# Patient Record
Sex: MALE | Race: WHITE | NOT HISPANIC OR LATINO | Employment: FULL TIME | URBAN - NONMETROPOLITAN AREA
[De-identification: names, ages, dates, MRNs, and addresses within clinical notes are randomized per-mention and may not be internally consistent; named-entity substitution may affect disease eponyms.]

---

## 2023-01-17 ENCOUNTER — OFFICE VISIT (OUTPATIENT)
Dept: UROLOGY | Facility: CLINIC | Age: 48
End: 2023-01-17
Payer: COMMERCIAL

## 2023-01-17 VITALS — WEIGHT: 270 LBS | BODY MASS INDEX: 36.57 KG/M2 | HEIGHT: 72 IN

## 2023-01-17 DIAGNOSIS — N42.9 DISORDER OF PROSTATE: ICD-10-CM

## 2023-01-17 DIAGNOSIS — R35.0 FREQUENCY OF URINATION: Primary | ICD-10-CM

## 2023-01-17 DIAGNOSIS — R79.89 LOW TESTOSTERONE: ICD-10-CM

## 2023-01-17 LAB
BILIRUB BLD-MCNC: NEGATIVE MG/DL
CLARITY, POC: CLEAR
COLOR UR: YELLOW
DEPRECATED RDW RBC AUTO: 40 FL (ref 37–54)
ERYTHROCYTE [DISTWIDTH] IN BLOOD BY AUTOMATED COUNT: 13 % (ref 12.3–15.4)
ESTRADIOL SERPL HS-MCNC: 30.5 PG/ML
EXPIRATION DATE: ABNORMAL
GLUCOSE UR STRIP-MCNC: ABNORMAL MG/DL
HCT VFR BLD AUTO: 49.3 % (ref 37.5–51)
HGB BLD-MCNC: 17 G/DL (ref 13–17.7)
KETONES UR QL: NEGATIVE
LEUKOCYTE EST, POC: NEGATIVE
Lab: ABNORMAL
MCH RBC QN AUTO: 29 PG (ref 26.6–33)
MCHC RBC AUTO-ENTMCNC: 34.5 G/DL (ref 31.5–35.7)
MCV RBC AUTO: 84.1 FL (ref 79–97)
NITRITE UR-MCNC: NEGATIVE MG/ML
PH UR: 5.5 [PH] (ref 5–8)
PLATELET # BLD AUTO: 250 10*3/MM3 (ref 140–450)
PMV BLD AUTO: 11.5 FL (ref 6–12)
PROT UR STRIP-MCNC: NEGATIVE MG/DL
PSA SERPL-MCNC: 0.52 NG/ML (ref 0–4)
RBC # BLD AUTO: 5.86 10*6/MM3 (ref 4.14–5.8)
RBC # UR STRIP: NEGATIVE /UL
SP GR UR: 1.01 (ref 1–1.03)
TESTOST SERPL-MCNC: 265 NG/DL (ref 249–836)
UROBILINOGEN UR QL: NORMAL
WBC NRBC COR # BLD: 9.45 10*3/MM3 (ref 3.4–10.8)

## 2023-01-17 PROCEDURE — 82670 ASSAY OF TOTAL ESTRADIOL: CPT | Performed by: UROLOGY

## 2023-01-17 PROCEDURE — 81003 URINALYSIS AUTO W/O SCOPE: CPT | Performed by: UROLOGY

## 2023-01-17 PROCEDURE — 85027 COMPLETE CBC AUTOMATED: CPT | Performed by: UROLOGY

## 2023-01-17 PROCEDURE — 84153 ASSAY OF PSA TOTAL: CPT | Performed by: UROLOGY

## 2023-01-17 PROCEDURE — 84403 ASSAY OF TOTAL TESTOSTERONE: CPT | Performed by: UROLOGY

## 2023-01-17 PROCEDURE — 99203 OFFICE O/P NEW LOW 30 MIN: CPT | Performed by: UROLOGY

## 2023-01-17 RX ORDER — BACLOFEN 10 MG/1
10 TABLET ORAL 2 TIMES DAILY PRN
COMMUNITY
Start: 2023-01-06

## 2023-01-17 RX ORDER — LOSARTAN POTASSIUM 100 MG/1
100 TABLET ORAL DAILY
COMMUNITY
Start: 2023-01-06

## 2023-01-17 RX ORDER — IBUPROFEN 800 MG/1
800 TABLET ORAL
COMMUNITY
Start: 2023-01-06

## 2023-01-17 RX ORDER — TADALAFIL 5 MG/1
5 TABLET ORAL DAILY PRN
Qty: 30 TABLET | Refills: 6 | Status: SHIPPED | OUTPATIENT
Start: 2023-01-17

## 2023-01-17 RX ORDER — INSULIN GLARGINE 100 [IU]/ML
INJECTION, SOLUTION SUBCUTANEOUS
COMMUNITY
Start: 2022-11-29

## 2023-01-17 RX ORDER — HYDROCHLOROTHIAZIDE 12.5 MG/1
12.5 CAPSULE, GELATIN COATED ORAL DAILY
COMMUNITY
Start: 2022-12-13

## 2023-01-17 RX ORDER — TESTOSTERONE CYPIONATE 200 MG/ML
INJECTION, SOLUTION INTRAMUSCULAR
Qty: 10 ML | Refills: 2 | Status: SHIPPED | OUTPATIENT
Start: 2023-01-17 | End: 2023-03-16 | Stop reason: SDUPTHER

## 2023-01-17 RX ORDER — CITALOPRAM 40 MG/1
40 TABLET ORAL DAILY
COMMUNITY
Start: 2023-01-06

## 2023-01-17 RX ORDER — GLIPIZIDE 10 MG/1
TABLET ORAL EVERY 8 HOURS
COMMUNITY
Start: 2022-09-30

## 2023-01-17 RX ORDER — DAPAGLIFLOZIN 5 MG/1
5 TABLET, FILM COATED ORAL EVERY MORNING
COMMUNITY
Start: 2023-01-06

## 2023-01-17 RX ORDER — SITAGLIPTIN 100 MG/1
100 TABLET, FILM COATED ORAL DAILY
COMMUNITY
Start: 2022-12-13

## 2023-01-17 RX ORDER — BUSPIRONE HYDROCHLORIDE 5 MG/1
TABLET ORAL
COMMUNITY
Start: 2023-01-06

## 2023-01-17 NOTE — PROGRESS NOTES
"Chief Complaint:      Chief Complaint   Patient presents with   • Fatigue     New patient        HPI:   47 y.o. male referred for fatigue.  He has a positive JULIO-androgen deficiency in the age male questionnaire:   The patient was queried regarding the androgen deficiency in the age male questionnaire.  This is a validated questionnaire that was performed on a set of 314 [place] male physicians. When it was positive, it correlated directly with a 94% chance of low testosterone.  Patient indicates there is a decrease in libido or sex drive, a lack of energy, decreased  strength and endurance, a decreased \"enjoyment of life\", sad and grumpy feelings with significant difficulty maintaining erections. There has also been a recent deterioration regarding work performance.  His hemoglobin A1c is 8 he has a negative family history for prostate cancer he has had no surgeries he carries the diagnosis of depression he brought significant labs including hemoglobin of 15.7.  A creatinine of 0.68.  And a testosterone of 326.  With an A1c of 8.0.  He is desirous of instituting testosterone therapy he also has mild to moderate erectile dysfunction.    Past Medical History:     History reviewed. No pertinent past medical history.    Current Meds:     Current Outpatient Medications   Medication Sig Dispense Refill   • baclofen (LIORESAL) 10 MG tablet Take 10 mg by mouth 2 (Two) Times a Day As Needed.     • busPIRone (BUSPAR) 5 MG tablet TAKE ONE TABLET BY MOUTH THREE TIMES A DAY FOR ANXIETY     • citalopram (CeleXA) 40 MG tablet Take 40 mg by mouth Daily.     • Farxiga 5 MG tablet tablet Take 5 mg by mouth Every Morning.     • glipizide (GLUCOTROL) 10 MG tablet Take  by mouth Every 8 (Eight) Hours.     • hydroCHLOROthiazide (MICROZIDE) 12.5 MG capsule Take 12.5 mg by mouth Daily.     • ibuprofen (ADVIL,MOTRIN) 800 MG tablet Take 800 mg by mouth 3 (Three) Times a Day With Meals.     • Insulin Glargine (BASAGLAR KWIKPEN) 100 UNIT/ML " injection pen INJECT 15 UNITS SUBCUTANEOUSLY EVERY DAY AT BEDTIME. MAY TITRATE BY 2 TO 4 UNITS EVERY 2 TO 4 DAYS TO GET FASTING BLOOD SUGAR LESS THAN 130     • Januvia 100 MG tablet Take 100 mg by mouth Daily.     • losartan (COZAAR) 100 MG tablet Take 100 mg by mouth Daily.       No current facility-administered medications for this visit.        Allergies:      No Known Allergies     Past Surgical History:     History reviewed. No pertinent surgical history.    Social History:     Social History     Socioeconomic History   • Marital status:        Family History:     History reviewed. No pertinent family history.    Review of Systems:     Review of Systems   Constitutional: Negative.    HENT: Negative.    Eyes: Negative.    Respiratory: Negative.    Cardiovascular: Negative.    Gastrointestinal: Negative.    Endocrine: Negative.    Musculoskeletal: Negative.    Allergic/Immunologic: Negative.    Neurological: Negative.    Hematological: Negative.    Psychiatric/Behavioral: Negative.        Physical Exam:     Physical Exam  Vitals and nursing note reviewed.   Constitutional:       Appearance: He is well-developed.   HENT:      Head: Normocephalic and atraumatic.   Eyes:      Conjunctiva/sclera: Conjunctivae normal.      Pupils: Pupils are equal, round, and reactive to light.   Cardiovascular:      Rate and Rhythm: Normal rate and regular rhythm.      Heart sounds: Normal heart sounds.   Pulmonary:      Effort: Pulmonary effort is normal.      Breath sounds: Normal breath sounds.   Abdominal:      General: Bowel sounds are normal.      Palpations: Abdomen is soft.   Genitourinary:     Penis: Normal.       Testes: Normal.   Musculoskeletal:         General: Normal range of motion.      Cervical back: Normal range of motion.   Skin:     General: Skin is warm and dry.   Neurological:      Mental Status: He is alert and oriented to person, place, and time.      Deep Tendon Reflexes: Reflexes are normal and  symmetric.   Psychiatric:         Behavior: Behavior normal.         Thought Content: Thought content normal.         Judgment: Judgment normal.         I have reviewed the following portions of the patient's history: Allergies, current medications, past family history, past medical history, past social history, past surgical history, problem list, and ROS and confirm it is accurate.    Recent Image (CT and/or KUB):      CT Abdomen and Pelvis: No results found for this or any previous visit.       CT Stone Protocol: No results found for this or any previous visit.       KUB: No results found for this or any previous visit.       Labs (past 3 months):      No visits with results within 3 Month(s) from this visit.   Latest known visit with results is:   No results found for any previous visit.        Procedure:       Assessment/Plan:   Low Testosterone: This pleasant male patient presents today with signs and symptoms that are consistent with low testosterone. He has positive Chandra questionnaire by history, this includes both the sexual and nonsexual side effects.  Sexual side effects include inability to achieve and maintain an erection, inability to maintain his erection, and decreased interest in sexual activity.  Nonsexual symptomatology includes fatigue, difficulty completing a job, and tiredness.  We had a discussion of the various forms of testosterone available including parenteral, topical, and the form of a patch.  We discussed the efficacy of the gels and the injections, as well as the cost and benefits analysis.  We discussed the studies and talked about heart disease and its effect on prostate cancer, both of which are negligible.  He gave verbal consent to proceed with treatment.  He understands the risks and benefits of length.  He also completed his attempts at fertility. He understands the partial effect on spermatogenesis.  Erectile dysfunction-we discussed the anatomy and physiology of the penis and  the endothelium.  We discussed the various forms of erectile dysfunction including peripheral vascular occlusive disease, postoperative, secondary to radiation treatments of the prostate, and arterial inflow.  We discussed the various treatment options available including oral medication and its various forms.  We discussed the use of both generic and non-generic Viagra.  We discussed Cialis and a longer half-life of 17 hours as well as the other 2 medications.  We discussed cost involved with this including the fact that the generic is much cheaper but is taken as multiple pills because they are 20 mg dosages.  We did discuss the other alternatives including penile injections, vacuum erection devices and surgical intervention reserved for only the most severe cases.  We discussed the need for testosterone in about 20% of cases of erectile dysfunction.  Start tadalafil  PSA testing-I am recommending a PSA blood test that stands for prostate specific antigen.  I discussed the pathophysiology of PSA testing indicating its use in the diagnosis and management of prostate cancer.  I discussed the normal range being 0 to 4, but more appropriately being much closer to 0 to 2 in a normal male.  I discussed the fact that after a certain age we don't recommend PSA testing especially in view of numerous comorbidities, that this will not be a useful test.  I discussed many of the things that can artificially raise PSA including a recent infection, urinary tract infection, and recent sexual intercourse, or even the type of movement such as manipulation of the prostate from riding a bicycle.  After all this is taken into account when the test is reviewed, the most important use of PSA is the velocity measurement.  In other words, the change of PSA with time is a very important factor in the use and that we look for greater than 20% rise over a year to help us make the prediction of prostate cancer.  I also discussed that the use  with prostate cancer indicating that after a radical prostatectomy, the PSA should be 0 and any rise indicates an early biochemical recurrence.  Hyperestrogenism-we spoke about the role of estrogen metabolism and breakdown in the  presence of testosterone replacement therapy.  We spoke about how high estradiol levels can interfere with the improvement noted in a man on testosterone as well as significant side effects such as pseudogynecomastia.  We discussed the use of the medication Arimidex using a very judicious low-dose fashion to prevent too low of an estradiol which would precipitate bone complications.          This document has been electronically signed by JERROD GOLDSTEIN MD January 17, 2023 09:09 EST    Dictated Utilizing Dragon Dictation: Part of this note may be an electronic transcription/translation of spoken language to printed text using the Dragon Dictation System.

## 2023-01-18 PROBLEM — R79.89 LOW TESTOSTERONE: Status: ACTIVE | Noted: 2023-01-18

## 2023-01-25 ENCOUNTER — TELEPHONE (OUTPATIENT)
Dept: UROLOGY | Facility: CLINIC | Age: 48
End: 2023-01-25
Payer: COMMERCIAL

## 2023-03-16 ENCOUNTER — OFFICE VISIT (OUTPATIENT)
Dept: UROLOGY | Facility: CLINIC | Age: 48
End: 2023-03-16
Payer: COMMERCIAL

## 2023-03-16 VITALS — WEIGHT: 270 LBS | HEIGHT: 72 IN | BODY MASS INDEX: 36.57 KG/M2

## 2023-03-16 DIAGNOSIS — N42.9 DISORDER OF PROSTATE: Primary | ICD-10-CM

## 2023-03-16 DIAGNOSIS — R35.0 FREQUENCY OF URINATION: ICD-10-CM

## 2023-03-16 DIAGNOSIS — R79.89 LOW TESTOSTERONE: ICD-10-CM

## 2023-03-16 LAB
DEPRECATED RDW RBC AUTO: 40.7 FL (ref 37–54)
ERYTHROCYTE [DISTWIDTH] IN BLOOD BY AUTOMATED COUNT: 13 % (ref 12.3–15.4)
ESTRADIOL SERPL HS-MCNC: 75.7 PG/ML
HCT VFR BLD AUTO: 54.4 % (ref 37.5–51)
HGB BLD-MCNC: 17.5 G/DL (ref 13–17.7)
MCH RBC QN AUTO: 27.8 PG (ref 26.6–33)
MCHC RBC AUTO-ENTMCNC: 32.2 G/DL (ref 31.5–35.7)
MCV RBC AUTO: 86.5 FL (ref 79–97)
PLATELET # BLD AUTO: 303 10*3/MM3 (ref 140–450)
PMV BLD AUTO: 11.4 FL (ref 6–12)
PSA SERPL-MCNC: 0.58 NG/ML (ref 0–4)
RBC # BLD AUTO: 6.29 10*6/MM3 (ref 4.14–5.8)
TESTOST SERPL-MCNC: 747 NG/DL (ref 249–836)
WBC NRBC COR # BLD: 10.77 10*3/MM3 (ref 3.4–10.8)

## 2023-03-16 PROCEDURE — 84403 ASSAY OF TOTAL TESTOSTERONE: CPT | Performed by: UROLOGY

## 2023-03-16 PROCEDURE — 99214 OFFICE O/P EST MOD 30 MIN: CPT | Performed by: UROLOGY

## 2023-03-16 PROCEDURE — 82670 ASSAY OF TOTAL ESTRADIOL: CPT | Performed by: UROLOGY

## 2023-03-16 PROCEDURE — 84153 ASSAY OF PSA TOTAL: CPT | Performed by: UROLOGY

## 2023-03-16 PROCEDURE — 85027 COMPLETE CBC AUTOMATED: CPT | Performed by: UROLOGY

## 2023-03-16 RX ORDER — TESTOSTERONE CYPIONATE 200 MG/ML
INJECTION, SOLUTION INTRAMUSCULAR
Qty: 10 ML | Refills: 2 | Status: SHIPPED | OUTPATIENT
Start: 2023-03-16

## 2023-03-16 NOTE — PROGRESS NOTES
"Chief Complaint:      Chief Complaint   Patient presents with   • Low Testosterone      2 month fu        HPI:   47 y.o. male patient returns today for follow-up.  He has been on testosterone replacement therapy.  He reports a dramatic improvement in his JULIO questionnaire: -JULIO-androgen deficiency in the age male questionnaire. The patient was queried regarding the androgen deficiency in the age male questionnaire.  This is a validated questionnaire that was performed on a set of 314 [place] male physicians. When it was positive it correlated directly with a 94% chance of low testosterone.  Patient indicates there is a decrease in libido or sex drive, a lack of energy, decreased  strength and endurance, a decreased \"enjoyment of life\", sad and grumpy feelings with significant difficulty maintaining erections.  There has also been a recent deterioration regarding work performance. He reports weight loss.  He has good facility and the use of subcutaneous and intramuscular injections as well as comfort level and using the medication in a sterile fashion.  He understands he should use only the prescribed dose.  He is here for appropriate lab monitoring regarding this.  He understands this is a controlled substance and therefore must be watched closely, will not be refilled in the medical loss or miscalculation of the dose.  He is very happy with the treatment and therefore wants to continue it.  Past Medical History:     Past Medical History:   Diagnosis Date   • Diabetes mellitus (HCC)    • Hypertension        Current Meds:     Current Outpatient Medications   Medication Sig Dispense Refill   • baclofen (LIORESAL) 10 MG tablet Take 10 mg by mouth 2 (Two) Times a Day As Needed.     • busPIRone (BUSPAR) 5 MG tablet TAKE ONE TABLET BY MOUTH THREE TIMES A DAY FOR ANXIETY     • citalopram (CeleXA) 40 MG tablet Take 40 mg by mouth Daily.     • Farxiga 5 MG tablet tablet Take 5 mg by mouth Every Morning.     • glipizide " "(GLUCOTROL) 10 MG tablet Take  by mouth Every 8 (Eight) Hours.     • hydroCHLOROthiazide (MICROZIDE) 12.5 MG capsule Take 12.5 mg by mouth Daily.     • ibuprofen (ADVIL,MOTRIN) 800 MG tablet Take 800 mg by mouth 3 (Three) Times a Day With Meals.     • Insulin Glargine (BASAGLAR KWIKPEN) 100 UNIT/ML injection pen INJECT 15 UNITS SUBCUTANEOUSLY EVERY DAY AT BEDTIME. MAY TITRATE BY 2 TO 4 UNITS EVERY 2 TO 4 DAYS TO GET FASTING BLOOD SUGAR LESS THAN 130     • Januvia 100 MG tablet Take 100 mg by mouth Daily.     • losartan (COZAAR) 100 MG tablet Take 100 mg by mouth Daily.     • Syringe 25G X 5/8\" 3 ML misc Use as directed 2 x weekly 24 each 3   • tadalafil (Cialis) 5 MG tablet Take 1 tablet by mouth Daily As Needed for Erectile Dysfunction. Take one Daily 30 tablet 6   • Testosterone Cypionate (Depo-Testosterone) 200 MG/ML injection Inject 1/2 cc subcutaneously every Monday and Thursday 10 mL 2     No current facility-administered medications for this visit.        Allergies:      No Known Allergies     Past Surgical History:     Past Surgical History:   Procedure Laterality Date   • SHOULDER ROTATOR CUFF REPAIR         Social History:     Social History     Socioeconomic History   • Marital status:    Tobacco Use   • Smoking status: Never   • Smokeless tobacco: Never   Substance and Sexual Activity   • Alcohol use: Not Currently   • Drug use: Never   • Sexual activity: Defer       Family History:     Family History   Problem Relation Age of Onset   • Heart disease Father    • Hypertension Father    • No Known Problems Mother        Review of Systems:     Review of Systems   Constitutional: Negative.    HENT: Negative.    Eyes: Negative.    Respiratory: Negative.    Cardiovascular: Negative.    Gastrointestinal: Negative.    Endocrine: Negative.    Musculoskeletal: Negative.    Allergic/Immunologic: Negative.    Neurological: Negative.    Hematological: Negative.    Psychiatric/Behavioral: Negative.  "       Physical Exam:     Physical Exam  Vitals and nursing note reviewed.   Constitutional:       Appearance: He is well-developed.   HENT:      Head: Normocephalic and atraumatic.   Eyes:      Conjunctiva/sclera: Conjunctivae normal.      Pupils: Pupils are equal, round, and reactive to light.   Cardiovascular:      Rate and Rhythm: Normal rate and regular rhythm.      Heart sounds: Normal heart sounds.   Pulmonary:      Effort: Pulmonary effort is normal.      Breath sounds: Normal breath sounds.   Abdominal:      General: Bowel sounds are normal.      Palpations: Abdomen is soft.   Musculoskeletal:         General: Normal range of motion.      Cervical back: Normal range of motion.   Skin:     General: Skin is warm and dry.   Neurological:      Mental Status: He is alert and oriented to person, place, and time.      Deep Tendon Reflexes: Reflexes are normal and symmetric.   Psychiatric:         Behavior: Behavior normal.         Thought Content: Thought content normal.         Judgment: Judgment normal.         I have reviewed the following portions of the patient's history: Allergies, current medications, past family history, past medical history, past social history, past surgical history, problem list, and ROS and confirm it is accurate.    Recent Image (CT and/or KUB):      CT Abdomen and Pelvis: No results found for this or any previous visit.       CT Stone Protocol: No results found for this or any previous visit.       KUB: No results found for this or any previous visit.       Labs (past 3 months):      Office Visit on 01/17/2023   Component Date Value Ref Range Status   • Color 01/17/2023 Yellow  Yellow, Straw, Dark Yellow, Whitney Final   • Clarity, UA 01/17/2023 Clear  Clear Final   • Specific Gravity  01/17/2023 1.015  1.005 - 1.030 Final   • pH, Urine 01/17/2023 5.5  5.0 - 8.0 Final   • Leukocytes 01/17/2023 Negative  Negative Final   • Nitrite, UA 01/17/2023 Negative  Negative Final   • Protein, POC  01/17/2023 Negative  Negative mg/dL Final   • Glucose, UA 01/17/2023 3+ (A)  Negative mg/dL Final   • Ketones, UA 01/17/2023 Negative  Negative Final   • Urobilinogen, UA 01/17/2023 Normal  Normal, 0.2 E.U./dL Final   • Bilirubin 01/17/2023 Negative  Negative Final   • Blood, UA 01/17/2023 Negative  Negative Final   • Lot Number 01/17/2023 n   Final   • Expiration Date 01/17/2023 n   Final   • PSA 01/17/2023 0.523  0.000 - 4.000 ng/mL Final   • Testosterone, Total 01/17/2023 265.00  249.00 - 836.00 ng/dL Final   • Estradiol 01/17/2023 30.5  pg/mL Final   • WBC 01/17/2023 9.45  3.40 - 10.80 10*3/mm3 Final   • RBC 01/17/2023 5.86 (H)  4.14 - 5.80 10*6/mm3 Final   • Hemoglobin 01/17/2023 17.0  13.0 - 17.7 g/dL Final   • Hematocrit 01/17/2023 49.3  37.5 - 51.0 % Final   • MCV 01/17/2023 84.1  79.0 - 97.0 fL Final   • MCH 01/17/2023 29.0  26.6 - 33.0 pg Final   • MCHC 01/17/2023 34.5  31.5 - 35.7 g/dL Final   • RDW 01/17/2023 13.0  12.3 - 15.4 % Final   • RDW-SD 01/17/2023 40.0  37.0 - 54.0 fl Final   • MPV 01/17/2023 11.5  6.0 - 12.0 fL Final   • Platelets 01/17/2023 250  140 - 450 10*3/mm3 Final        Procedure:       Assessment/Plan:   Low testosterone: Patient is here for follow-up.  Since beginning the medication, he has been very pleased.  He reports a dramatic improvement in his erections, ability to achieve and maintain an erection, improvement in libido, increase in frequency of morning erections, and a noticeable weight loss consistent with the treatment.   He is going to have appropriate safety laboratory parameters checked.   He understands that the new data implicates testosterone with the development of prostate cancer and this is all but been disproven and the medical literature as well as the risks of cardiovascular disease which has actually also been disproven.  He understands that while he is a candidate for topical therapy if he is in contact with children this is not an option because it has been  shown to accentuate genitalia development at an early age that is frequently irreversible.  He also understands this it is a controlled substance and as such will not be prescribed without appropriate follow-up and appropriate laboratory investigation.  He understands effects on spermatogenesis including the fact that this is not always completely reversible and not always completely limited his ability to father a child.  He has demonstrated facility in the technique of both intramuscular and subcutaneous injection and has been taught sterility when drawing up the medication.    Erectile dysfunction-we discussed the anatomy and physiology of the penis and the endothelium.  We discussed the various forms of erectile dysfunction including peripheral vascular occlusive disease, postoperative, secondary to radiation treatments of the prostate, and arterial inflow.  We discussed the various treatment options available including oral medication and its various forms.  We discussed the use of both generic and non-generic Viagra.  We discussed Cialis and a longer half-life of 17 hours as well as the other 2 medications.  We discussed cost involved with this including the fact that the generic is much cheaper but is taken as multiple pills because they are 20 mg dosages.  We did discuss the other alternatives including penile injections, vacuum erection devices, and surgical intervention reserved for only the most severe cases.  We discussed the need for testosterone in about 20% of cases of erectile dysfunction.  Continue PDE-5 inhibition    PSA testing-I am recommending a PSA blood test that stands for prostate specific antigen.  I discussed the pathophysiology of PSA testing indicating its use in the diagnosis and management of prostate cancer.  I discussed the normal range being 0 to 4, but more appropriately being much closer to 0 to 2 in a normal male.  I discussed the fact that after a certain age we don't recommend  PSA testing especially in view of numerous comorbidities, that this will not be a useful test.  I discussed many of the things that can artificially raise PSA including a recent infection, urinary tract infection, and recent sexual intercourse, or even the type of movement such as manipulation of the prostate from riding a bicycle.  After all this is taken into account when the test is reviewed, the most important use of PSA is the velocity measurement.  In other words, the change of PSA with time is a very important factor in the use and that we look for greater than 20% rise over a year to help us make the prediction of prostate cancer.  I also discussed that the use with prostate cancer indicating that after a radical prostatectomy, the PSA should be 0 and any rise indicates an early biochemical recurrence.    Hyperestrogenism-we spoke about the role of estrogen metabolism and breakdown in the  presence of testosterone replacement therapy.  We spoke about how high estradiol levels can interfere with the improvement noted in a man on testosterone as well as significant side effects such as pseudogynecomastia.  We discussed the use of the medication Arimidex used in an off label setting and using a very judicious low-dose fashion to prevent too low of an estradiol which would precipitate bone complications.  Going to check an estradiol level.  He is at higher risk for breast problems due to his replacement    Polycythemia-I am going to check a CBC to rule out hemoglobin changes.  We utilized the American Heart Association guidelines for polycythemia which is a hemoglobin greater than 18 and a hematocrit greater than 54.5.  Recommend therapeutic phlebotomy as the treatment.  It is important that we indicate that is the most likely cause of the polycythemia.  We also discussed the possibility of decreasing the dose of testosterone and of stopping it altogether.    Controlled substance-he understands this is a  controlled substance and as such is regulated under the state.  I cannot refill it outside the prescribed window.  I stressed the importance of follow-up and appropriate laboratory parameters monitoring.    Liver function tests-according to the AUA guidelines we will not check liver functions due to the fact this is not an oral alkylated testosterone                This document has been electronically signed by JERROD GOLDSTEIN MD March 16, 2023 08:14 EDT    Dictated Utilizing Dragon Dictation: Part of this note may be an electronic transcription/translation of spoken language to printed text using the Dragon Dictation System.

## 2023-03-29 ENCOUNTER — TELEPHONE (OUTPATIENT)
Dept: UROLOGY | Facility: CLINIC | Age: 48
End: 2023-03-29
Payer: COMMERCIAL

## 2023-03-29 DIAGNOSIS — N42.9 DISORDER OF PROSTATE: Primary | ICD-10-CM

## 2023-03-29 RX ORDER — ANASTROZOLE 1 MG/1
1 TABLET ORAL WEEKLY
Qty: 12 TABLET | Refills: 3 | Status: SHIPPED | OUTPATIENT
Start: 2023-03-29

## 2023-03-29 NOTE — TELEPHONE ENCOUNTER
I called and talked to the pt and sent his meds in      ----- Message from Ulysses Ruth MD sent at 3/28/2023  7:15 AM EDT -----  After thorough review of his labs his estradiol level is above the normal range and in concert with his testosterone replacement I am recommending 1 mg of Arimidex per week.  This is an off label use.  Its use is condoned in the AUA guidelines.  And we will monitor his estradiol closely.  The primary reason we do not want estradiol to get too high is because of bone damage and gynecomastia.  ----- Message -----  From: Lab, Background User  Sent: 3/16/2023   6:26 PM EDT  To: Ulysses Ruth MD

## 2023-09-14 ENCOUNTER — OFFICE VISIT (OUTPATIENT)
Dept: UROLOGY | Facility: CLINIC | Age: 48
End: 2023-09-14
Payer: COMMERCIAL

## 2023-09-14 VITALS
WEIGHT: 283.6 LBS | HEART RATE: 64 BPM | DIASTOLIC BLOOD PRESSURE: 90 MMHG | HEIGHT: 72 IN | SYSTOLIC BLOOD PRESSURE: 162 MMHG | BODY MASS INDEX: 38.41 KG/M2

## 2023-09-14 DIAGNOSIS — R35.0 FREQUENCY OF URINATION: ICD-10-CM

## 2023-09-14 DIAGNOSIS — R79.89 LOW TESTOSTERONE: Primary | ICD-10-CM

## 2023-09-14 LAB
DEPRECATED RDW RBC AUTO: 42.9 FL (ref 37–54)
ERYTHROCYTE [DISTWIDTH] IN BLOOD BY AUTOMATED COUNT: 13.9 % (ref 12.3–15.4)
ESTRADIOL SERPL HS-MCNC: 42.1 PG/ML
HCT VFR BLD AUTO: 51.3 % (ref 37.5–51)
HGB BLD-MCNC: 17.3 G/DL (ref 13–17.7)
MCH RBC QN AUTO: 28.7 PG (ref 26.6–33)
MCHC RBC AUTO-ENTMCNC: 33.7 G/DL (ref 31.5–35.7)
MCV RBC AUTO: 85.2 FL (ref 79–97)
PLATELET # BLD AUTO: 220 10*3/MM3 (ref 140–450)
PMV BLD AUTO: 11.9 FL (ref 6–12)
PSA SERPL-MCNC: 0.46 NG/ML (ref 0–4)
RBC # BLD AUTO: 6.02 10*6/MM3 (ref 4.14–5.8)
TESTOST SERPL-MCNC: 943 NG/DL (ref 193–740)
WBC NRBC COR # BLD: 10.06 10*3/MM3 (ref 3.4–10.8)

## 2023-09-14 PROCEDURE — 85027 COMPLETE CBC AUTOMATED: CPT | Performed by: UROLOGY

## 2023-09-14 PROCEDURE — 84153 ASSAY OF PSA TOTAL: CPT | Performed by: UROLOGY

## 2023-09-14 PROCEDURE — 84403 ASSAY OF TOTAL TESTOSTERONE: CPT | Performed by: UROLOGY

## 2023-09-14 PROCEDURE — 82670 ASSAY OF TOTAL ESTRADIOL: CPT | Performed by: UROLOGY

## 2023-09-14 RX ORDER — TESTOSTERONE CYPIONATE 200 MG/ML
INJECTION, SOLUTION INTRAMUSCULAR
Qty: 10 ML | Refills: 2 | Status: SHIPPED | OUTPATIENT
Start: 2023-09-14

## 2023-09-14 RX ORDER — TADALAFIL 20 MG/1
20 TABLET ORAL AS NEEDED
Qty: 20 TABLET | Refills: 1 | Status: SHIPPED | OUTPATIENT
Start: 2023-09-14

## 2023-09-14 RX ORDER — TADALAFIL 5 MG/1
5 TABLET ORAL DAILY PRN
Qty: 30 TABLET | Refills: 6 | Status: SHIPPED | OUTPATIENT
Start: 2023-09-14

## 2023-09-20 NOTE — PROGRESS NOTES
"Chief Complaint:      Chief Complaint   Patient presents with    Low testosterone       HPI:   48 y.o. male patient returns today for follow-up.  He has been on testosterone replacement therapy.  He reports a dramatic improvement in his JULIO questionnaire: -JULIO-androgen deficiency in the age male questionnaire. The patient was queried regarding the androgen deficiency in the age male questionnaire.  This is a validated questionnaire that was performed on a set of 314 [place] male physicians. When it was positive it correlated directly with a 94% chance of low testosterone.  Patient indicates there is a decrease in libido or sex drive, a lack of energy, decreased  strength and endurance, a decreased \"enjoyment of life\", sad and grumpy feelings with significant difficulty maintaining erections.  There has also been a recent deterioration regarding work performance. He reports weight loss.  He has good facility and the use of subcutaneous and intramuscular injections as well as comfort level and using the medication in a sterile fashion.  He understands he should use only the prescribed dose.  He is here for appropriate lab monitoring regarding this.  He understands this is a controlled substance and therefore must be watched closely, will not be refilled in the medical loss or miscalculation of the dose.  He is very happy with the treatment and therefore wants to continue it.    Past Medical History:     Past Medical History:   Diagnosis Date    Diabetes mellitus     Hypertension        Current Meds:     Current Outpatient Medications   Medication Sig Dispense Refill    Syringe 25G X 5/8\" 3 ML misc Use as directed 2 x weekly 24 each 3    tadalafil (Cialis) 5 MG tablet Take 1 tablet by mouth Daily As Needed for Erectile Dysfunction. Take one Daily 30 tablet 6    Testosterone Cypionate (Depo-Testosterone) 200 MG/ML injection Inject 1/2 cc subcutaneously every Monday and Thursday 10 mL 2    anastrozole (ARIMIDEX) 1 MG " tablet Take 1 tablet by mouth 1 (One) Time Per Week. For 12 weeks refills in case another round is needed in the future. 12 tablet 3    baclofen (LIORESAL) 10 MG tablet Take 10 mg by mouth 2 (Two) Times a Day As Needed.      busPIRone (BUSPAR) 5 MG tablet TAKE ONE TABLET BY MOUTH THREE TIMES A DAY FOR ANXIETY      citalopram (CeleXA) 40 MG tablet Take 40 mg by mouth Daily.      Farxiga 5 MG tablet tablet Take 5 mg by mouth Every Morning.      glipizide (GLUCOTROL) 10 MG tablet Take  by mouth Every 8 (Eight) Hours.      hydroCHLOROthiazide (MICROZIDE) 12.5 MG capsule Take 12.5 mg by mouth Daily.      ibuprofen (ADVIL,MOTRIN) 800 MG tablet Take 800 mg by mouth 3 (Three) Times a Day With Meals.      Insulin Glargine (BASAGLAR KWIKPEN) 100 UNIT/ML injection pen INJECT 15 UNITS SUBCUTANEOUSLY EVERY DAY AT BEDTIME. MAY TITRATE BY 2 TO 4 UNITS EVERY 2 TO 4 DAYS TO GET FASTING BLOOD SUGAR LESS THAN 130      Januvia 100 MG tablet Take 100 mg by mouth Daily.      losartan (COZAAR) 100 MG tablet Take 100 mg by mouth Daily.      tadalafil (Cialis) 20 MG tablet Take 1 tablet by mouth As Needed for Erectile Dysfunction. 20 tablet 1     No current facility-administered medications for this visit.        Allergies:      No Known Allergies     Past Surgical History:     Past Surgical History:   Procedure Laterality Date    SHOULDER ROTATOR CUFF REPAIR         Social History:     Social History     Socioeconomic History    Marital status:    Tobacco Use    Smoking status: Never    Smokeless tobacco: Never   Substance and Sexual Activity    Alcohol use: Not Currently    Drug use: Never    Sexual activity: Defer       Family History:     Family History   Problem Relation Age of Onset    Heart disease Father     Hypertension Father     No Known Problems Mother        Review of Systems:     Review of Systems   Constitutional: Negative.    HENT: Negative.     Eyes: Negative.    Respiratory: Negative.     Cardiovascular: Negative.     Gastrointestinal: Negative.    Endocrine: Negative.    Musculoskeletal: Negative.    Allergic/Immunologic: Negative.    Neurological: Negative.    Hematological: Negative.    Psychiatric/Behavioral: Negative.       Physical Exam:     Physical Exam  Vitals and nursing note reviewed.   Constitutional:       Appearance: He is well-developed.   HENT:      Head: Normocephalic and atraumatic.   Eyes:      Conjunctiva/sclera: Conjunctivae normal.      Pupils: Pupils are equal, round, and reactive to light.   Cardiovascular:      Rate and Rhythm: Normal rate and regular rhythm.      Heart sounds: Normal heart sounds.   Pulmonary:      Effort: Pulmonary effort is normal.      Breath sounds: Normal breath sounds.   Abdominal:      General: Bowel sounds are normal.      Palpations: Abdomen is soft.   Musculoskeletal:         General: Normal range of motion.      Cervical back: Normal range of motion.   Skin:     General: Skin is warm and dry.   Neurological:      Mental Status: He is alert and oriented to person, place, and time.      Deep Tendon Reflexes: Reflexes are normal and symmetric.   Psychiatric:         Behavior: Behavior normal.         Thought Content: Thought content normal.         Judgment: Judgment normal.       I have reviewed the following portions of the patient's history: Allergies, current medications, past family history, past medical history, past social history, past surgical history, problem list, and ROS and confirm it is accurate.    Recent Image (CT and/or KUB):      CT Abdomen and Pelvis: No results found for this or any previous visit.       CT Stone Protocol: No results found for this or any previous visit.       KUB: No results found for this or any previous visit.       Labs (past 3 months):      Office Visit on 09/14/2023   Component Date Value Ref Range Status    PSA 09/14/2023 0.463  0.000 - 4.000 ng/mL Final    Testosterone, Total 09/14/2023 943.00 (H)  193.00 - 740.00 ng/dL Final     Estradiol 09/14/2023 42.1  pg/mL Final    WBC 09/14/2023 10.06  3.40 - 10.80 10*3/mm3 Final    RBC 09/14/2023 6.02 (H)  4.14 - 5.80 10*6/mm3 Final    Hemoglobin 09/14/2023 17.3  13.0 - 17.7 g/dL Final    Hematocrit 09/14/2023 51.3 (H)  37.5 - 51.0 % Final    MCV 09/14/2023 85.2  79.0 - 97.0 fL Final    MCH 09/14/2023 28.7  26.6 - 33.0 pg Final    MCHC 09/14/2023 33.7  31.5 - 35.7 g/dL Final    RDW 09/14/2023 13.9  12.3 - 15.4 % Final    RDW-SD 09/14/2023 42.9  37.0 - 54.0 fl Final    MPV 09/14/2023 11.9  6.0 - 12.0 fL Final    Platelets 09/14/2023 220  140 - 450 10*3/mm3 Final        Procedure:       Assessment/Plan:   Low testosterone: Patient is here for follow-up.  Since beginning the medication, he has been very pleased.  He reports a dramatic improvement in his erections, ability to achieve and maintain an erection, improvement in libido, increase in frequency of morning erections, and a noticeable weight loss consistent with the treatment.   He is going to have appropriate safety laboratory parameters checked.   He understands that the new data implicates testosterone with the development of prostate cancer and this is all but been disproven and the medical literature as well as the risks of cardiovascular disease which has actually also been disproven.  He understands that while he is a candidate for topical therapy if he is in contact with children this is not an option because it has been shown to accentuate genitalia development at an early age that is frequently irreversible.  He also understands this it is a controlled substance and as such will not be prescribed without appropriate follow-up and appropriate laboratory investigation.  He understands effects on spermatogenesis including the fact that this is not always completely reversible and not always completely limited his ability to father a child.  He has demonstrated facility in the technique of both intramuscular and subcutaneous injection and  has been taught sterility when drawing up the medication.    Erectile dysfunction-we discussed the anatomy and physiology of the penis and the endothelium.  We discussed the various forms of erectile dysfunction including peripheral vascular occlusive disease, postoperative, secondary to radiation treatments of the prostate, and arterial inflow.  We discussed the various treatment options available including oral medication and its various forms.  We discussed the use of both generic and non-generic Viagra.  We discussed Cialis and a longer half-life of 17 hours as well as the other 2 medications.  We discussed cost involved with this including the fact that the generic is much cheaper but is taken as multiple pills because they are 20 mg dosages.  We did discuss the other alternatives including penile injections, vacuum erection devices, and surgical intervention reserved for only the most severe cases.  We discussed the need for testosterone in about 20% of cases of erectile dysfunction.  Continue PDE-5 inhibition    PSA testing-I am recommending a PSA blood test that stands for prostate specific antigen.  I discussed the pathophysiology of PSA testing indicating its use in the diagnosis and management of prostate cancer.  I discussed the normal range being 0 to 4, but more appropriately being much closer to 0 to 2 in a normal male.  I discussed the fact that after a certain age we don't recommend PSA testing especially in view of numerous comorbidities, that this will not be a useful test.  I discussed many of the things that can artificially raise PSA including a recent infection, urinary tract infection, and recent sexual intercourse, or even the type of movement such as manipulation of the prostate from riding a bicycle.  After all this is taken into account when the test is reviewed, the most important use of PSA is the velocity measurement.  In other words, the change of PSA with time is a very important  factor in the use and that we look for greater than 20% rise over a year to help us make the prediction of prostate cancer.  I also discussed that the use with prostate cancer indicating that after a radical prostatectomy, the PSA should be 0 and any rise indicates an early biochemical recurrence.    Hyperestrogenism-we spoke about the role of estrogen metabolism and breakdown in the  presence of testosterone replacement therapy.  We spoke about how high estradiol levels can interfere with the improvement noted in a man on testosterone as well as significant side effects such as pseudogynecomastia.  We discussed the use of the medication Arimidex used in an off label setting and using a very judicious low-dose fashion to prevent too low of an estradiol which would precipitate bone complications.  Going to check an estradiol level.  He is at higher risk for breast problems due to his replacement    Polycythemia-I am going to check a CBC to rule out hemoglobin changes.  We utilized the American Heart Association guidelines for polycythemia which is a hemoglobin greater than 18 and a hematocrit greater than 54.5.  Recommend therapeutic phlebotomy as the treatment.  It is important that we indicate that is the most likely cause of the polycythemia.  We also discussed the possibility of decreasing the dose of testosterone and of stopping it altogether.    Controlled substance-he understands this is a controlled substance and as such is regulated under the state.  I cannot refill it outside the prescribed window.  I stressed the importance of follow-up and appropriate laboratory parameters monitoring.    Liver function tests-according to the AUA guidelines we will not check liver functions due to the fact this is not an oral alkylated testosterone        This document has been electronically signed by JERROD GOLDSTEIN MD September 20, 2023 13:32 EDT    Dictated Utilizing Dragon Dictation: Part of this note may be an  electronic transcription/translation of spoken language to printed text using the Dragon Dictation System.

## 2024-03-14 ENCOUNTER — OFFICE VISIT (OUTPATIENT)
Dept: UROLOGY | Facility: CLINIC | Age: 49
End: 2024-03-14
Payer: COMMERCIAL

## 2024-03-14 VITALS
SYSTOLIC BLOOD PRESSURE: 143 MMHG | BODY MASS INDEX: 37.65 KG/M2 | HEART RATE: 67 BPM | WEIGHT: 278 LBS | DIASTOLIC BLOOD PRESSURE: 82 MMHG | HEIGHT: 72 IN

## 2024-03-14 DIAGNOSIS — N42.9 DISORDER OF PROSTATE: ICD-10-CM

## 2024-03-14 DIAGNOSIS — R79.89 LOW TESTOSTERONE: Primary | ICD-10-CM

## 2024-03-14 DIAGNOSIS — R35.0 FREQUENCY OF URINATION: ICD-10-CM

## 2024-03-14 LAB
DEPRECATED RDW RBC AUTO: 39 FL (ref 37–54)
ERYTHROCYTE [DISTWIDTH] IN BLOOD BY AUTOMATED COUNT: 12.7 % (ref 12.3–15.4)
ESTRADIOL SERPL HS-MCNC: 16.5 PG/ML
HCT VFR BLD AUTO: 48.7 % (ref 37.5–51)
HGB BLD-MCNC: 16.6 G/DL (ref 13–17.7)
MCH RBC QN AUTO: 29.3 PG (ref 26.6–33)
MCHC RBC AUTO-ENTMCNC: 34.1 G/DL (ref 31.5–35.7)
MCV RBC AUTO: 86 FL (ref 79–97)
PLATELET # BLD AUTO: 237 10*3/MM3 (ref 140–450)
PMV BLD AUTO: 12.1 FL (ref 6–12)
PSA SERPL-MCNC: 0.59 NG/ML (ref 0–4)
RBC # BLD AUTO: 5.66 10*6/MM3 (ref 4.14–5.8)
TESTOST SERPL-MCNC: 62.8 NG/DL (ref 193–740)
WBC NRBC COR # BLD AUTO: 6.52 10*3/MM3 (ref 3.4–10.8)

## 2024-03-14 PROCEDURE — 84153 ASSAY OF PSA TOTAL: CPT | Performed by: UROLOGY

## 2024-03-14 PROCEDURE — 85027 COMPLETE CBC AUTOMATED: CPT | Performed by: UROLOGY

## 2024-03-14 PROCEDURE — 84403 ASSAY OF TOTAL TESTOSTERONE: CPT | Performed by: UROLOGY

## 2024-03-14 PROCEDURE — 82670 ASSAY OF TOTAL ESTRADIOL: CPT | Performed by: UROLOGY

## 2024-03-14 RX ORDER — TADALAFIL 5 MG/1
5 TABLET ORAL DAILY PRN
Qty: 90 TABLET | Refills: 3 | Status: SHIPPED | OUTPATIENT
Start: 2024-03-14

## 2024-03-14 RX ORDER — TADALAFIL 20 MG/1
20 TABLET ORAL AS NEEDED
Qty: 20 TABLET | Refills: 1 | Status: SHIPPED | OUTPATIENT
Start: 2024-03-14

## 2024-03-14 RX ORDER — TESTOSTERONE CYPIONATE 200 MG/ML
INJECTION, SOLUTION INTRAMUSCULAR
Qty: 10 ML | Refills: 2 | Status: SHIPPED | OUTPATIENT
Start: 2024-03-14

## 2024-03-14 NOTE — PROGRESS NOTES
"Chief Complaint:      Chief Complaint   Patient presents with    Low Testosterone      6 month        HPI:   48 y.o. male patient returns today for follow-up.  He has been on testosterone replacement therapy.  He reports a dramatic improvement in his JULIO questionnaire: -JULIO-androgen deficiency in the age male questionnaire. The patient was queried regarding the androgen deficiency in the age male questionnaire.  This is a validated questionnaire that was performed on a set of 314 [place] male physicians. When it was positive it correlated directly with a 94% chance of low testosterone.  Patient indicates there is a decrease in libido or sex drive, a lack of energy, decreased  strength and endurance, a decreased \"enjoyment of life\", sad and grumpy feelings with significant difficulty maintaining erections.  There has also been a recent deterioration regarding work performance. He reports weight loss.  He has good facility and the use of subcutaneous and intramuscular injections as well as comfort level and using the medication in a sterile fashion.  He understands he should use only the prescribed dose.  He is here for appropriate lab monitoring regarding this.  He understands this is a controlled substance and therefore must be watched closely, will not be refilled in the medical loss or miscalculation of the dose.  He is very happy with the treatment and therefore wants to continue it.  Past Medical History:     Past Medical History:   Diagnosis Date    Diabetes mellitus     Hypertension        Current Meds:     Current Outpatient Medications   Medication Sig Dispense Refill    anastrozole (ARIMIDEX) 1 MG tablet Take 1 tablet by mouth 1 (One) Time Per Week. For 12 weeks refills in case another round is needed in the future. 12 tablet 3    baclofen (LIORESAL) 10 MG tablet Take 1 tablet by mouth 2 (Two) Times a Day As Needed.      busPIRone (BUSPAR) 5 MG tablet TAKE ONE TABLET BY MOUTH THREE TIMES A DAY FOR " "ANXIETY      citalopram (CeleXA) 40 MG tablet Take 1 tablet by mouth Daily.      glipizide (GLUCOTROL) 10 MG tablet Take  by mouth Every 8 (Eight) Hours.      hydroCHLOROthiazide (MICROZIDE) 12.5 MG capsule Take 1 capsule by mouth Daily.      ibuprofen (ADVIL,MOTRIN) 800 MG tablet Take 1 tablet by mouth 3 (Three) Times a Day With Meals.      Insulin Glargine (BASAGLAR KWIKPEN) 100 UNIT/ML injection pen INJECT 15 UNITS SUBCUTANEOUSLY EVERY DAY AT BEDTIME. MAY TITRATE BY 2 TO 4 UNITS EVERY 2 TO 4 DAYS TO GET FASTING BLOOD SUGAR LESS THAN 130      losartan (COZAAR) 100 MG tablet Take 1 tablet by mouth Daily.      Syringe 25G X 5/8\" 3 ML misc Use as directed 2 x weekly 24 each 3    tadalafil (Cialis) 20 MG tablet Take 1 tablet by mouth As Needed for Erectile Dysfunction. 20 tablet 1    tadalafil (Cialis) 5 MG tablet Take 1 tablet by mouth Daily As Needed for Erectile Dysfunction. Take one Daily 30 tablet 6    Testosterone Cypionate (Depo-Testosterone) 200 MG/ML injection Inject 1/2 cc subcutaneously every Monday and Thursday 10 mL 2    Farxiga 5 MG tablet tablet Take 5 mg by mouth Every Morning.      Januvia 100 MG tablet Take 100 mg by mouth Daily.       No current facility-administered medications for this visit.        Allergies:      No Known Allergies     Past Surgical History:     Past Surgical History:   Procedure Laterality Date    SHOULDER ROTATOR CUFF REPAIR         Social History:     Social History     Socioeconomic History    Marital status:    Tobacco Use    Smoking status: Never    Smokeless tobacco: Never   Vaping Use    Vaping status: Never Used   Substance and Sexual Activity    Alcohol use: Not Currently    Drug use: Never    Sexual activity: Defer       Family History:     Family History   Problem Relation Age of Onset    Heart disease Father     Hypertension Father     No Known Problems Mother        Review of Systems:     Review of Systems   Constitutional: Negative.    HENT: Negative.   "   Eyes: Negative.    Respiratory: Negative.     Cardiovascular: Negative.    Gastrointestinal: Negative.    Endocrine: Negative.    Musculoskeletal: Negative.    Allergic/Immunologic: Negative.    Neurological: Negative.    Hematological: Negative.    Psychiatric/Behavioral: Negative.         Physical Exam:     Physical Exam  Vitals and nursing note reviewed.   Constitutional:       Appearance: He is well-developed.   HENT:      Head: Normocephalic and atraumatic.   Eyes:      Conjunctiva/sclera: Conjunctivae normal.      Pupils: Pupils are equal, round, and reactive to light.   Cardiovascular:      Rate and Rhythm: Normal rate and regular rhythm.      Heart sounds: Normal heart sounds.   Pulmonary:      Effort: Pulmonary effort is normal.      Breath sounds: Normal breath sounds.   Abdominal:      General: Bowel sounds are normal.      Palpations: Abdomen is soft.   Musculoskeletal:         General: Normal range of motion.      Cervical back: Normal range of motion.   Skin:     General: Skin is warm and dry.   Neurological:      Mental Status: He is alert and oriented to person, place, and time.      Deep Tendon Reflexes: Reflexes are normal and symmetric.   Psychiatric:         Behavior: Behavior normal.         Thought Content: Thought content normal.         Judgment: Judgment normal.         I have reviewed the following portions of the patient's history: Allergies, current medications, past family history, past medical history, past social history, past surgical history, problem list, and ROS and confirm it is accurate.    Recent Image (CT and/or KUB):      CT Abdomen and Pelvis: No results found for this or any previous visit.       CT Stone Protocol: No results found for this or any previous visit.       KUB: No results found for this or any previous visit.       Labs (past 3 months):      No visits with results within 3 Month(s) from this visit.   Latest known visit with results is:   Office Visit on  09/14/2023   Component Date Value Ref Range Status    PSA 09/14/2023 0.463  0.000 - 4.000 ng/mL Final    Testosterone, Total 09/14/2023 943.00 (H)  193.00 - 740.00 ng/dL Final    Estradiol 09/14/2023 42.1  pg/mL Final    WBC 09/14/2023 10.06  3.40 - 10.80 10*3/mm3 Final    RBC 09/14/2023 6.02 (H)  4.14 - 5.80 10*6/mm3 Final    Hemoglobin 09/14/2023 17.3  13.0 - 17.7 g/dL Final    Hematocrit 09/14/2023 51.3 (H)  37.5 - 51.0 % Final    MCV 09/14/2023 85.2  79.0 - 97.0 fL Final    MCH 09/14/2023 28.7  26.6 - 33.0 pg Final    MCHC 09/14/2023 33.7  31.5 - 35.7 g/dL Final    RDW 09/14/2023 13.9  12.3 - 15.4 % Final    RDW-SD 09/14/2023 42.9  37.0 - 54.0 fl Final    MPV 09/14/2023 11.9  6.0 - 12.0 fL Final    Platelets 09/14/2023 220  140 - 450 10*3/mm3 Final        Procedure:       Assessment/Plan:   Low testosterone: Patient is here for follow-up.  Since beginning the medication, he has been very pleased.  He reports a dramatic improvement in his erections, ability to achieve and maintain an erection, improvement in libido, increase in frequency of morning erections, and a noticeable weight loss consistent with the treatment.   He is going to have appropriate safety laboratory parameters checked.   He understands that the new data implicates testosterone with the development of prostate cancer and this is all but been disproven and the medical literature as well as the risks of cardiovascular disease which has actually also been disproven.  He understands that while he is a candidate for topical therapy if he is in contact with children this is not an option because it has been shown to accentuate genitalia development at an early age that is frequently irreversible.  He also understands this it is a controlled substance and as such will not be prescribed without appropriate follow-up and appropriate laboratory investigation.  He understands effects on spermatogenesis including the fact that this is not always completely  reversible and not always completely limited his ability to father a child.  He has demonstrated facility in the technique of both intramuscular and subcutaneous injection and has been taught sterility when drawing up the medication.    Erectile dysfunction-we discussed the anatomy and physiology of the penis and the endothelium.  We discussed the various forms of erectile dysfunction including peripheral vascular occlusive disease, postoperative, secondary to radiation treatments of the prostate, and arterial inflow.  We discussed the various treatment options available including oral medication and its various forms.  We discussed the use of both generic and non-generic Viagra.  We discussed Cialis and a longer half-life of 17 hours as well as the other 2 medications.  We discussed cost involved with this including the fact that the generic is much cheaper but is taken as multiple pills because they are 20 mg dosages.  We did discuss the other alternatives including penile injections, vacuum erection devices, and surgical intervention reserved for only the most severe cases.  We discussed the need for testosterone in about 20% of cases of erectile dysfunction.  Continue PDE-5 inhibition    PSA testing-I am recommending a PSA blood test that stands for prostate specific antigen.  I discussed the pathophysiology of PSA testing indicating its use in the diagnosis and management of prostate cancer.  I discussed the normal range being 0 to 4, but more appropriately being much closer to 0 to 2 in a normal male.  I discussed the fact that after a certain age we don't recommend PSA testing especially in view of numerous comorbidities, that this will not be a useful test.  I discussed many of the things that can artificially raise PSA including a recent infection, urinary tract infection, and recent sexual intercourse, or even the type of movement such as manipulation of the prostate from riding a bicycle.  After all this  is taken into account when the test is reviewed, the most important use of PSA is the velocity measurement.  In other words, the change of PSA with time is a very important factor in the use and that we look for greater than 20% rise over a year to help us make the prediction of prostate cancer.  I also discussed that the use with prostate cancer indicating that after a radical prostatectomy, the PSA should be 0 and any rise indicates an early biochemical recurrence.    Hyperestrogenism-we spoke about the role of estrogen metabolism and breakdown in the  presence of testosterone replacement therapy.  We spoke about how high estradiol levels can interfere with the improvement noted in a man on testosterone as well as significant side effects such as pseudogynecomastia.  We discussed the use of the medication Arimidex used in an off label setting and using a very judicious low-dose fashion to prevent too low of an estradiol which would precipitate bone complications.  Going to check an estradiol level.  He is at higher risk for breast problems due to his replacement    Polycythemia-I am going to check a CBC to rule out hemoglobin changes.  We utilized the American Heart Association guidelines for polycythemia which is a hemoglobin greater than 18 and a hematocrit greater than 54.5.  Recommend therapeutic phlebotomy as the treatment.  It is important that we indicate that is the most likely cause of the polycythemia.  We also discussed the possibility of decreasing the dose of testosterone and of stopping it altogether.    Controlled substance-he understands this is a controlled substance and as such is regulated under the state.  I cannot refill it outside the prescribed window.  I stressed the importance of follow-up and appropriate laboratory parameters monitoring.    Liver function tests-according to the AUA guidelines we will not check liver functions due to the fact this is not an oral alkylated  testosterone            This document has been electronically signed by JERROD GOLDSTEIN MD March 14, 2024 08:15 EDT    Dictated Utilizing Dragon Dictation: Part of this note may be an electronic transcription/translation of spoken language to printed text using the Dragon Dictation System.

## 2024-05-20 DIAGNOSIS — N42.9 DISORDER OF PROSTATE: ICD-10-CM

## 2024-05-20 RX ORDER — ANASTROZOLE 1 MG/1
1 TABLET ORAL WEEKLY
Qty: 12 TABLET | Refills: 3 | Status: SHIPPED | OUTPATIENT
Start: 2024-05-20

## 2024-06-27 ENCOUNTER — OFFICE VISIT (OUTPATIENT)
Dept: SURGERY | Facility: CLINIC | Age: 49
End: 2024-06-27
Payer: COMMERCIAL

## 2024-06-27 VITALS
TEMPERATURE: 97.7 F | HEART RATE: 67 BPM | SYSTOLIC BLOOD PRESSURE: 140 MMHG | HEIGHT: 72 IN | WEIGHT: 288 LBS | BODY MASS INDEX: 39.01 KG/M2 | OXYGEN SATURATION: 96 % | DIASTOLIC BLOOD PRESSURE: 82 MMHG

## 2024-06-27 DIAGNOSIS — K42.9 UMBILICAL HERNIA WITHOUT OBSTRUCTION AND WITHOUT GANGRENE: Primary | ICD-10-CM

## 2024-06-27 PROCEDURE — 99204 OFFICE O/P NEW MOD 45 MIN: CPT | Performed by: SURGERY

## 2024-06-27 RX ORDER — LORATADINE 10 MG/1
10 TABLET ORAL DAILY
COMMUNITY

## 2024-06-27 RX ORDER — BLOOD SUGAR DIAGNOSTIC
STRIP MISCELLANEOUS
COMMUNITY
Start: 2024-06-06

## 2024-06-27 RX ORDER — HYDROCODONE BITARTRATE AND ACETAMINOPHEN 7.5; 325 MG/1; MG/1
TABLET ORAL
COMMUNITY
Start: 2024-06-06

## 2024-06-27 NOTE — PROGRESS NOTES
Patient: Elie Elmore    YOB: 1975    Date: 06/27/2024    Primary Care Provider: Collins Fields FNP    Chief Complaint   Patient presents with    Hernia       SUBJECTIVE:    History of present illness:  The patient is in the office today for evaluation and treatment of a umbilical hernia.  He has had this for several years but appears to be getting bigger.      Apparently has had this for several years, and has gradually grown in size, it causes him some aspects of discomfort especially with prolonged standing or heavy lifting.    The following portions of the patient's history were reviewed and updated as appropriate: allergies, current medications, past family history, past medical history, past social history, past surgical history and problem list.      Review of Systems   Constitutional:  Negative for chills, fever and unexpected weight change.   HENT:  Negative for trouble swallowing and voice change.    Eyes:  Negative for visual disturbance.   Respiratory:  Negative for apnea, cough, chest tightness, shortness of breath and wheezing.    Cardiovascular:  Negative for chest pain, palpitations and leg swelling.   Gastrointestinal:  Negative for abdominal distention, abdominal pain, anal bleeding, blood in stool, constipation, diarrhea, nausea, rectal pain and vomiting.   Endocrine: Negative for cold intolerance and heat intolerance.   Genitourinary:  Negative for difficulty urinating, dysuria, flank pain, scrotal swelling and testicular pain.   Musculoskeletal:  Negative for back pain, gait problem and joint swelling.   Skin:  Negative for color change, rash and wound.   Neurological:  Negative for dizziness, syncope, speech difficulty, weakness, numbness and headaches.   Hematological:  Negative for adenopathy. Does not bruise/bleed easily.   Psychiatric/Behavioral:  Negative for confusion. The patient is not nervous/anxious.          Allergies:  No Known Allergies    Medications:    Current  "Outpatient Medications:     anastrozole (ARIMIDEX) 1 MG tablet, TAKE ONE TABLET BY MOUTH ONCE A WEEK, Disp: 12 tablet, Rfl: 3    baclofen (LIORESAL) 10 MG tablet, Take 1 tablet by mouth 2 (Two) Times a Day As Needed., Disp: , Rfl:     busPIRone (BUSPAR) 5 MG tablet, TAKE ONE TABLET BY MOUTH THREE TIMES A DAY FOR ANXIETY, Disp: , Rfl:     citalopram (CeleXA) 40 MG tablet, Take 1 tablet by mouth Daily., Disp: , Rfl:     Farxiga 5 MG tablet tablet, Take 1 tablet by mouth Every Morning., Disp: , Rfl:     glipizide (GLUCOTROL) 10 MG tablet, Take  by mouth Every 8 (Eight) Hours., Disp: , Rfl:     hydroCHLOROthiazide (MICROZIDE) 12.5 MG capsule, Take 1 capsule by mouth Daily., Disp: , Rfl:     HYDROcodone-acetaminophen (NORCO) 7.5-325 MG per tablet, TAKE ONE TABLET BY MOUTH FOUR TIMES A DAY AS NEEDED FOR BREAKTHROUGH PAIN, Disp: , Rfl:     ibuprofen (ADVIL,MOTRIN) 800 MG tablet, Take 1 tablet by mouth 3 (Three) Times a Day With Meals., Disp: , Rfl:     Insulin Glargine (BASAGLAR KWIKPEN) 100 UNIT/ML injection pen, INJECT 15 UNITS SUBCUTANEOUSLY EVERY DAY AT BEDTIME. MAY TITRATE BY 2 TO 4 UNITS EVERY 2 TO 4 DAYS TO GET FASTING BLOOD SUGAR LESS THAN 130, Disp: , Rfl:     Januvia 100 MG tablet, Take 1 tablet by mouth Daily., Disp: , Rfl:     loratadine (CLARITIN) 10 MG tablet, Take 1 tablet by mouth Daily., Disp: , Rfl:     losartan (COZAAR) 100 MG tablet, Take 1 tablet by mouth Daily., Disp: , Rfl:     OneTouch Ultra test strip, TEST ONCE TO TWICE EVERY DAY AND AS NEEDED, Disp: , Rfl:     Syringe 25G X 5/8\" 3 ML misc, Use as directed 2 x weekly, Disp: 24 each, Rfl: 3    tadalafil (Cialis) 20 MG tablet, Take 1 tablet by mouth As Needed for Erectile Dysfunction., Disp: 20 tablet, Rfl: 1    tadalafil (Cialis) 5 MG tablet, Take 1 tablet by mouth Daily As Needed for Erectile Dysfunction. Take one Daily, Disp: 90 tablet, Rfl: 3    Testosterone Cypionate (Depo-Testosterone) 200 MG/ML injection, Inject 1/2 cc subcutaneously every " "Monday and Thursday, Disp: 10 mL, Rfl: 2    History:  Past Medical History:   Diagnosis Date    Diabetes mellitus     Hypertension        Past Surgical History:   Procedure Laterality Date    SHOULDER ROTATOR CUFF REPAIR         Family History   Problem Relation Age of Onset    Heart disease Father     Hypertension Father     No Known Problems Mother        Social History     Tobacco Use    Smoking status: Never    Smokeless tobacco: Never   Vaping Use    Vaping status: Never Used   Substance Use Topics    Alcohol use: Not Currently    Drug use: Never        OBJECTIVE:    Vital Signs:   Vitals:    06/27/24 1401   BP: 140/82   Pulse: 67   Temp: 97.7 °F (36.5 °C)   SpO2: 96%   Weight: 131 kg (288 lb)   Height: 182.9 cm (72.01\")       Physical Exam:       Physical Exam:     General Appearance:    Alert, cooperative, in no acute distress   Head:    Normocephalic, without obvious abnormality, atraumatic   Eyes:            Lids and lashes normal, conjunctivae and sclerae normal, no   icterus, no pallor, corneas clear, PERRLA   Ears:    Ears appear intact with no abnormalities noted   Throat:   No oral lesions, no thrush, oral mucosa moist   Neck:   No adenopathy, supple, trachea midline, no thyromegaly, no   carotid bruit, no JVD   Back:     No kyphosis present, no scoliosis present, no skin lesions,      erythema or scars, no tenderness to percussion or                   palpation,   range of motion normal   Lungs:     Clear to auscultation,respirations regular, even and                  unlabored    Heart:    Regular rhythm and normal rate, normal S1 and S2, no            murmur, no gallop, no rub, no click   Chest Wall:    No abnormalities observed   Abdomen:     Normal bowel sounds, no masses, no organomegaly, soft        non-tender, non-distended, no guarding, there is evidence of a incarcerated umbilical hernia   Extremities:   Moves all extremities well, no edema, no cyanosis, no             redness   Pulses:   " Pulses palpable and equal bilaterally   Skin:   No bleeding, bruising or rash   Lymph nodes:   No palpable adenopathy   Neurologic:   Cranial nerves 2 - 12 grossly intact, sensation intact, DTR       present and equal bilaterally       Results Review:   I reviewed the patient's new clinical results.  I reviewed the patient's new imaging results and agree with the interpretation.  I reviewed the patient's other test results and agree with the interpretation    Review of Systems was reviewed and confirmed as accurate as documented by the MA.    ASSESSMENT/PLAN:    1. Umbilical hernia without obstruction and without gangrene        I had a detailed and extensive discussion with the patient in the office and they understand that they need to undergo robotic laparoscopic assisted umbilical hernia repair with mesh.  Full risks and benefits of operative versus nonoperative intervention were discussed with the patient and these included things such as nonresolution of symptoms and possible worsening of symptoms without surgical intervention versus infection, bleeding, possible recurrent hernia, possible postoperative neuralgia from nerve damage or involvement with scar tissue, etc.  The patient understands, agrees, and had no questions for me at the end of the office visit.     I discussed the patients findings and my recommendations with patient.          Electronically signed by Cori Tobin MA  06/27/24

## 2024-06-28 ENCOUNTER — PATIENT ROUNDING (BHMG ONLY) (OUTPATIENT)
Dept: SURGERY | Facility: CLINIC | Age: 49
End: 2024-06-28
Payer: COMMERCIAL

## 2024-06-28 PROBLEM — K42.9 UMBILICAL HERNIA WITHOUT OBSTRUCTION AND WITHOUT GANGRENE: Status: ACTIVE | Noted: 2024-06-27

## 2024-06-28 NOTE — PROGRESS NOTES
June 28, 2024    Hello, may I speak with Elie Elmore?    My name is Juan Antonio Christopher      I am  with [facility]  [address]  [place] [address] [phone].    Before we get started may I verify your date of birth? 1975    I am calling to officially welcome you to our practice and ask about your recent visit. Is this a good time to talk? yes    Tell me about your visit with us. What things went well?  Everything was good.        We're always looking for ways to make our patients' experiences even better. Do you have recommendations on ways we may improve?  no, Everything was excellent.    Overall were you satisfied with your first visit to our practice? yes       I appreciate you taking the time to speak with me today. Is there anything else I can do for you? no      Thank you, and have a great day.

## 2024-07-09 ENCOUNTER — TELEPHONE (OUTPATIENT)
Dept: PREADMISSION TESTING | Facility: HOSPITAL | Age: 49
End: 2024-07-09

## 2024-07-10 ENCOUNTER — PRE-ADMISSION TESTING (OUTPATIENT)
Dept: PREADMISSION TESTING | Facility: HOSPITAL | Age: 49
End: 2024-07-10
Payer: COMMERCIAL

## 2024-07-10 VITALS — WEIGHT: 286 LBS | HEIGHT: 72 IN | BODY MASS INDEX: 38.74 KG/M2

## 2024-07-10 LAB
ANION GAP SERPL CALCULATED.3IONS-SCNC: 9.9 MMOL/L (ref 5–15)
BASOPHILS # BLD AUTO: 0.05 10*3/MM3 (ref 0–0.2)
BASOPHILS NFR BLD AUTO: 0.6 % (ref 0–1.5)
BUN SERPL-MCNC: 20 MG/DL (ref 6–20)
BUN/CREAT SERPL: 27.4 (ref 7–25)
CALCIUM SPEC-SCNC: 9 MG/DL (ref 8.6–10.5)
CHLORIDE SERPL-SCNC: 102 MMOL/L (ref 98–107)
CO2 SERPL-SCNC: 26.1 MMOL/L (ref 22–29)
CREAT SERPL-MCNC: 0.73 MG/DL (ref 0.76–1.27)
DEPRECATED RDW RBC AUTO: 42.1 FL (ref 37–54)
EGFRCR SERPLBLD CKD-EPI 2021: 110.8 ML/MIN/1.73
EOSINOPHIL # BLD AUTO: 0.16 10*3/MM3 (ref 0–0.4)
EOSINOPHIL NFR BLD AUTO: 1.9 % (ref 0.3–6.2)
ERYTHROCYTE [DISTWIDTH] IN BLOOD BY AUTOMATED COUNT: 13.2 % (ref 12.3–15.4)
GLUCOSE SERPL-MCNC: 149 MG/DL (ref 65–99)
HCT VFR BLD AUTO: 52.1 % (ref 37.5–51)
HGB BLD-MCNC: 17.6 G/DL (ref 13–17.7)
IMM GRANULOCYTES # BLD AUTO: 0.04 10*3/MM3 (ref 0–0.05)
IMM GRANULOCYTES NFR BLD AUTO: 0.5 % (ref 0–0.5)
LYMPHOCYTES # BLD AUTO: 1.89 10*3/MM3 (ref 0.7–3.1)
LYMPHOCYTES NFR BLD AUTO: 22.3 % (ref 19.6–45.3)
MCH RBC QN AUTO: 29.6 PG (ref 26.6–33)
MCHC RBC AUTO-ENTMCNC: 33.8 G/DL (ref 31.5–35.7)
MCV RBC AUTO: 87.6 FL (ref 79–97)
MONOCYTES # BLD AUTO: 0.69 10*3/MM3 (ref 0.1–0.9)
MONOCYTES NFR BLD AUTO: 8.1 % (ref 5–12)
NEUTROPHILS NFR BLD AUTO: 5.65 10*3/MM3 (ref 1.7–7)
NEUTROPHILS NFR BLD AUTO: 66.6 % (ref 42.7–76)
NRBC BLD AUTO-RTO: 0 /100 WBC (ref 0–0.2)
PLATELET # BLD AUTO: 217 10*3/MM3 (ref 140–450)
PMV BLD AUTO: 10.8 FL (ref 6–12)
POTASSIUM SERPL-SCNC: 4.1 MMOL/L (ref 3.5–5.2)
QT INTERVAL: 382 MS
QTC INTERVAL: 397 MS
RBC # BLD AUTO: 5.95 10*6/MM3 (ref 4.14–5.8)
SODIUM SERPL-SCNC: 138 MMOL/L (ref 136–145)
WBC NRBC COR # BLD AUTO: 8.48 10*3/MM3 (ref 3.4–10.8)

## 2024-07-10 PROCEDURE — 36415 COLL VENOUS BLD VENIPUNCTURE: CPT

## 2024-07-10 PROCEDURE — 80048 BASIC METABOLIC PNL TOTAL CA: CPT

## 2024-07-10 PROCEDURE — 93005 ELECTROCARDIOGRAM TRACING: CPT

## 2024-07-10 PROCEDURE — 85025 COMPLETE CBC W/AUTO DIFF WBC: CPT

## 2024-07-10 NOTE — DISCHARGE INSTRUCTIONS
Pre-Admission testing appointment completed today for patient's upcoming procedure here at [facility].    PAT PASS reviewed with patient and they verbalize understanding of the following:     Do not eat or drink anything after midnight the night before procedure unless otherwise instructed by physician/surgeon's office, this includes no gum, candy, mints, tobacco products or e-cigarettes.  Do not shave the area to be operated on at least 48 hours prior to procedure.  Do not wear makeup, lotion, hair products, or nail polish.  Do not wear any jewelry and remove all piercings.  Do not wear any adhesive if you wear dentures.  Do not wear contacts; bring in glasses if needed.  Only take medications on the morning of procedure as instructed by PAT nurse per anesthesia guidelines or as instructed by physician's office.  If you are on any blood thinners reach out to the physician/surgeon's office for instructions on when/if they will need to be stopped prior to procedure.  Bring in picture ID and insurance card, advanced directive copies if applicable, CPAP/BIPAP/Inhalers if indicated morning of procedure, leave any other valuables at home.  Ensure you have arranged for someone to drive you home the day of your procedure and someone to care for you at home afterwards. It is recommended that you do not drive, drink alcohol, or make any major legal decisions for at least 24 hours after your procedure is complete.  Chlorhexadine sponge along with instruction/information sheet given to patient. Readybath wipes given in lieu of CHG if patient has CHG allergy. Instructed patient to date, time, and initial the verification sheet once skin prep has been completed, and to return to [facility] the day of the procedure.     Introduction to anesthesia video watched by patient during appointment and anesthesia FAQ tip sheet given to patient.  Instructions and information given to patient about parking, hospital entrance, and  registration location.

## 2024-07-30 ENCOUNTER — ANESTHESIA EVENT (OUTPATIENT)
Dept: PERIOP | Facility: HOSPITAL | Age: 49
End: 2024-07-30
Payer: COMMERCIAL

## 2024-07-30 NOTE — ANESTHESIA PREPROCEDURE EVALUATION
Anesthesia Evaluation     Patient summary reviewed   no history of anesthetic complications:   NPO Solid Status: > 8 hours  NPO Liquid Status: > 8 hours           Airway   Mallampati: III  TM distance: >3 FB  Neck ROM: full  Difficult intubation highly probable, Possible difficult intubation and Large neck circumference  Dental      Pulmonary    (+) ,shortness of breath, sleep apnea, decreased breath sounds  (-) not a smoker  Cardiovascular     (+) hypertension, MAN, PVD      Neuro/Psych  GI/Hepatic/Renal/Endo    (+) obesity, morbid obesity, liver disease fatty liver disease, diabetes mellitus type 2 poorly controlled    Musculoskeletal     (+) arthralgias, back pain, chronic pain, myalgias  Abdominal   (+) obese   Substance History   (-) alcohol use, drug use     OB/GYN          Other        ROS/Med Hx Other: Labs reviewed  EKG sr                   Anesthesia Plan    ASA 3     general and general with block     (Risks and benefits discussed including risk of aspiration, recall and dental damage. All patient questions answered.    Will continue with plan of care.)  intravenous induction     Anesthetic plan, risks, benefits, and alternatives have been provided, discussed and informed consent has been obtained with: patient.  Pre-procedure education provided    CODE STATUS:

## 2024-07-31 ENCOUNTER — HOSPITAL ENCOUNTER (OUTPATIENT)
Facility: HOSPITAL | Age: 49
Setting detail: HOSPITAL OUTPATIENT SURGERY
Discharge: HOME | End: 2024-07-31
Attending: SURGERY | Admitting: SURGERY
Payer: COMMERCIAL

## 2024-07-31 ENCOUNTER — ANESTHESIA EVENT CONVERTED (OUTPATIENT)
Dept: ANESTHESIOLOGY | Facility: HOSPITAL | Age: 49
End: 2024-07-31
Payer: COMMERCIAL

## 2024-07-31 ENCOUNTER — ANESTHESIA (OUTPATIENT)
Dept: PERIOP | Facility: HOSPITAL | Age: 49
End: 2024-07-31
Payer: COMMERCIAL

## 2024-07-31 VITALS
SYSTOLIC BLOOD PRESSURE: 124 MMHG | DIASTOLIC BLOOD PRESSURE: 75 MMHG | OXYGEN SATURATION: 96 % | TEMPERATURE: 97.5 F | RESPIRATION RATE: 14 BRPM | HEART RATE: 70 BPM

## 2024-07-31 DIAGNOSIS — K42.9 UMBILICAL HERNIA WITHOUT OBSTRUCTION AND WITHOUT GANGRENE: ICD-10-CM

## 2024-07-31 LAB — GLUCOSE BLDC GLUCOMTR-MCNC: 153 MG/DL (ref 70–130)

## 2024-07-31 PROCEDURE — 99024 POSTOP FOLLOW-UP VISIT: CPT | Performed by: SURGERY

## 2024-07-31 PROCEDURE — 25010000002 PROPOFOL 10 MG/ML EMULSION: Performed by: NURSE ANESTHETIST, CERTIFIED REGISTERED

## 2024-07-31 PROCEDURE — 25010000002 BUPIVACAINE (PF) 0.5 % SOLUTION: Performed by: NURSE ANESTHETIST, CERTIFIED REGISTERED

## 2024-07-31 PROCEDURE — 25010000002 KETOROLAC TROMETHAMINE PER 15 MG: Performed by: NURSE ANESTHETIST, CERTIFIED REGISTERED

## 2024-07-31 PROCEDURE — 25010000002 PIPERACILLIN SOD-TAZOBACTAM PER 1 G: Performed by: SURGERY

## 2024-07-31 PROCEDURE — 25010000002 FENTANYL CITRATE (PF) 50 MCG/ML SOLUTION: Performed by: NURSE ANESTHETIST, CERTIFIED REGISTERED

## 2024-07-31 PROCEDURE — 25010000002 SUGAMMADEX 500 MG/5ML SOLUTION: Performed by: NURSE ANESTHETIST, CERTIFIED REGISTERED

## 2024-07-31 PROCEDURE — 25010000002 HYDROMORPHONE PER 4 MG: Performed by: NURSE ANESTHETIST, CERTIFIED REGISTERED

## 2024-07-31 PROCEDURE — 25010000002 METOCLOPRAMIDE PER 10 MG: Performed by: NURSE ANESTHETIST, CERTIFIED REGISTERED

## 2024-07-31 PROCEDURE — 25010000002 ONDANSETRON PER 1 MG: Performed by: NURSE ANESTHETIST, CERTIFIED REGISTERED

## 2024-07-31 PROCEDURE — 82948 REAGENT STRIP/BLOOD GLUCOSE: CPT

## 2024-07-31 PROCEDURE — 25810000003 SODIUM CHLORIDE 0.9 % SOLUTION: Performed by: NURSE ANESTHETIST, CERTIFIED REGISTERED

## 2024-07-31 PROCEDURE — 25010000002 DEXAMETHASONE PER 1 MG: Performed by: NURSE ANESTHETIST, CERTIFIED REGISTERED

## 2024-07-31 PROCEDURE — 25010000002 BUPIVACAINE (PF) 0.5 % SOLUTION: Performed by: SURGERY

## 2024-07-31 PROCEDURE — 49594 RPR AA HRN 1ST 3-10 NCR/STRN: CPT | Performed by: SURGERY

## 2024-07-31 PROCEDURE — 25010000002 GLYCOPYRROLATE PF 0.4 MG/2ML SOLUTION: Performed by: NURSE ANESTHETIST, CERTIFIED REGISTERED

## 2024-07-31 PROCEDURE — C1781 MESH (IMPLANTABLE): HCPCS | Performed by: SURGERY

## 2024-07-31 PROCEDURE — 11730 AVULSION NAIL PLATE SIMPLE 1: CPT | Performed by: SURGERY

## 2024-07-31 PROCEDURE — 25010000002 MIDAZOLAM PER 1MG: Performed by: NURSE ANESTHETIST, CERTIFIED REGISTERED

## 2024-07-31 DEVICE — PHASIX ST MESH, CIRCLE
Type: IMPLANTABLE DEVICE | Site: ABDOMEN | Status: FUNCTIONAL
Brand: PHASIX ST MESH

## 2024-07-31 DEVICE — DEV WND/CLS TISS STRATAFIX ABS SYMM PDS PLS CT2 SZ1 15CM VIL: Type: IMPLANTABLE DEVICE | Site: ABDOMEN | Status: FUNCTIONAL

## 2024-07-31 DEVICE — DEV CONTRL TISS STRATAFIX SPIRAL PDS PLS ABS 2/0 23CM VIL: Type: IMPLANTABLE DEVICE | Site: ABDOMEN | Status: FUNCTIONAL

## 2024-07-31 RX ORDER — KETAMINE HCL IN NACL, ISO-OSM 100MG/10ML
SYRINGE (ML) INJECTION AS NEEDED
Status: DISCONTINUED | OUTPATIENT
Start: 2024-07-31 | End: 2024-07-31 | Stop reason: SURG

## 2024-07-31 RX ORDER — DEXAMETHASONE SODIUM PHOSPHATE 4 MG/ML
INJECTION, SOLUTION INTRA-ARTICULAR; INTRALESIONAL; INTRAMUSCULAR; INTRAVENOUS; SOFT TISSUE AS NEEDED
Status: DISCONTINUED | OUTPATIENT
Start: 2024-07-31 | End: 2024-07-31 | Stop reason: SURG

## 2024-07-31 RX ORDER — DIAPER,BRIEF,INFANT-TODD,DISP
EACH MISCELLANEOUS AS NEEDED
Status: DISCONTINUED | OUTPATIENT
Start: 2024-07-31 | End: 2024-07-31 | Stop reason: HOSPADM

## 2024-07-31 RX ORDER — ROCURONIUM BROMIDE 50 MG/5 ML
SYRINGE (ML) INTRAVENOUS AS NEEDED
Status: DISCONTINUED | OUTPATIENT
Start: 2024-07-31 | End: 2024-07-31 | Stop reason: SURG

## 2024-07-31 RX ORDER — FENTANYL CITRATE 50 UG/ML
INJECTION, SOLUTION INTRAMUSCULAR; INTRAVENOUS AS NEEDED
Status: DISCONTINUED | OUTPATIENT
Start: 2024-07-31 | End: 2024-07-31 | Stop reason: SURG

## 2024-07-31 RX ORDER — EPHEDRINE SULFATE 5 MG/ML
INJECTION INTRAVENOUS AS NEEDED
Status: DISCONTINUED | OUTPATIENT
Start: 2024-07-31 | End: 2024-07-31 | Stop reason: SURG

## 2024-07-31 RX ORDER — BUPIVACAINE HYDROCHLORIDE 5 MG/ML
INJECTION, SOLUTION EPIDURAL; INTRACAUDAL
Status: COMPLETED | OUTPATIENT
Start: 2024-07-31 | End: 2024-07-31

## 2024-07-31 RX ORDER — MIDAZOLAM HYDROCHLORIDE 2 MG/2ML
INJECTION, SOLUTION INTRAMUSCULAR; INTRAVENOUS AS NEEDED
Status: DISCONTINUED | OUTPATIENT
Start: 2024-07-31 | End: 2024-07-31 | Stop reason: SURG

## 2024-07-31 RX ORDER — SULFAMETHOXAZOLE AND TRIMETHOPRIM 800; 160 MG/1; MG/1
1 TABLET ORAL 2 TIMES DAILY
Qty: 10 TABLET | Refills: 0 | Status: SHIPPED | OUTPATIENT
Start: 2024-07-31 | End: 2024-08-05

## 2024-07-31 RX ORDER — BUPIVACAINE HYDROCHLORIDE 5 MG/ML
INJECTION, SOLUTION EPIDURAL; INTRACAUDAL AS NEEDED
Status: DISCONTINUED | OUTPATIENT
Start: 2024-07-31 | End: 2024-07-31 | Stop reason: HOSPADM

## 2024-07-31 RX ORDER — HYDROMORPHONE HYDROCHLORIDE 2 MG/ML
INJECTION, SOLUTION INTRAMUSCULAR; INTRAVENOUS; SUBCUTANEOUS AS NEEDED
Status: DISCONTINUED | OUTPATIENT
Start: 2024-07-31 | End: 2024-07-31 | Stop reason: SURG

## 2024-07-31 RX ORDER — KETOROLAC TROMETHAMINE 30 MG/ML
INJECTION, SOLUTION INTRAMUSCULAR; INTRAVENOUS AS NEEDED
Status: DISCONTINUED | OUTPATIENT
Start: 2024-07-31 | End: 2024-07-31 | Stop reason: SURG

## 2024-07-31 RX ORDER — PROPOFOL 10 MG/ML
VIAL (ML) INTRAVENOUS AS NEEDED
Status: DISCONTINUED | OUTPATIENT
Start: 2024-07-31 | End: 2024-07-31 | Stop reason: SURG

## 2024-07-31 RX ORDER — SUCCINYLCHOLINE/SOD CL,ISO/PF 200MG/10ML
SYRINGE (ML) INTRAVENOUS AS NEEDED
Status: DISCONTINUED | OUTPATIENT
Start: 2024-07-31 | End: 2024-07-31 | Stop reason: SURG

## 2024-07-31 RX ORDER — IPRATROPIUM BROMIDE AND ALBUTEROL SULFATE 2.5; .5 MG/3ML; MG/3ML
3 SOLUTION RESPIRATORY (INHALATION) ONCE
Status: DISCONTINUED | OUTPATIENT
Start: 2024-07-31 | End: 2024-07-31 | Stop reason: HOSPADM

## 2024-07-31 RX ORDER — SODIUM CHLORIDE, SODIUM LACTATE, POTASSIUM CHLORIDE, CALCIUM CHLORIDE 600; 310; 30; 20 MG/100ML; MG/100ML; MG/100ML; MG/100ML
INJECTION, SOLUTION INTRAVENOUS CONTINUOUS PRN
Status: DISCONTINUED | OUTPATIENT
Start: 2024-07-31 | End: 2024-07-31

## 2024-07-31 RX ORDER — BUPIVACAINE HCL/0.9 % NACL/PF 0.125 %
PLASTIC BAG, INJECTION (ML) EPIDURAL AS NEEDED
Status: DISCONTINUED | OUTPATIENT
Start: 2024-07-31 | End: 2024-07-31 | Stop reason: SURG

## 2024-07-31 RX ORDER — ONDANSETRON 2 MG/ML
4 INJECTION INTRAMUSCULAR; INTRAVENOUS ONCE
Status: DISCONTINUED | OUTPATIENT
Start: 2024-07-31 | End: 2024-07-31 | Stop reason: HOSPADM

## 2024-07-31 RX ORDER — SODIUM CHLORIDE 9 MG/ML
INJECTION, SOLUTION INTRAVENOUS CONTINUOUS PRN
Status: DISCONTINUED | OUTPATIENT
Start: 2024-07-31 | End: 2024-07-31 | Stop reason: SURG

## 2024-07-31 RX ORDER — HYDROCODONE BITARTRATE AND ACETAMINOPHEN 7.5; 325 MG/1; MG/1
1 TABLET ORAL EVERY 6 HOURS PRN
Qty: 15 TABLET | Refills: 0 | Status: SHIPPED | OUTPATIENT
Start: 2024-07-31

## 2024-07-31 RX ORDER — METOCLOPRAMIDE HYDROCHLORIDE 5 MG/ML
INJECTION INTRAMUSCULAR; INTRAVENOUS AS NEEDED
Status: DISCONTINUED | OUTPATIENT
Start: 2024-07-31 | End: 2024-07-31 | Stop reason: SURG

## 2024-07-31 RX ORDER — ONDANSETRON 2 MG/ML
INJECTION INTRAMUSCULAR; INTRAVENOUS AS NEEDED
Status: DISCONTINUED | OUTPATIENT
Start: 2024-07-31 | End: 2024-07-31 | Stop reason: SURG

## 2024-07-31 RX ORDER — LORAZEPAM 2 MG/ML
1 INJECTION INTRAMUSCULAR ONCE
Status: DISCONTINUED | OUTPATIENT
Start: 2024-07-31 | End: 2024-07-31 | Stop reason: HOSPADM

## 2024-07-31 RX ADMIN — PIPERACILLIN AND TAZOBACTAM 4.5 G: 4; .5 INJECTION, POWDER, FOR SOLUTION INTRAVENOUS at 08:01

## 2024-07-31 RX ADMIN — Medication 25 MG: at 08:15

## 2024-07-31 RX ADMIN — BUPIVACAINE HYDROCHLORIDE 30 ML: 5 INJECTION, SOLUTION EPIDURAL; INTRACAUDAL; PERINEURAL at 08:15

## 2024-07-31 RX ADMIN — ONDANSETRON 4 MG: 2 INJECTION INTRAMUSCULAR; INTRAVENOUS at 08:05

## 2024-07-31 RX ADMIN — SODIUM CHLORIDE: 9 INJECTION, SOLUTION INTRAVENOUS at 08:49

## 2024-07-31 RX ADMIN — Medication 10 MG: at 08:13

## 2024-07-31 RX ADMIN — DEXAMETHASONE SODIUM PHOSPHATE 8 MG: 4 INJECTION, SOLUTION INTRA-ARTICULAR; INTRALESIONAL; INTRAMUSCULAR; INTRAVENOUS; SOFT TISSUE at 08:05

## 2024-07-31 RX ADMIN — Medication 25 MG: at 08:09

## 2024-07-31 RX ADMIN — FENTANYL CITRATE 50 MCG: 50 INJECTION, SOLUTION INTRAMUSCULAR; INTRAVENOUS at 08:11

## 2024-07-31 RX ADMIN — METOCLOPRAMIDE 5 MG: 5 INJECTION, SOLUTION INTRAMUSCULAR; INTRAVENOUS at 08:02

## 2024-07-31 RX ADMIN — Medication 40 MG: at 08:30

## 2024-07-31 RX ADMIN — HYDROMORPHONE HYDROCHLORIDE 0.5 MG: 2 INJECTION, SOLUTION INTRAMUSCULAR; INTRAVENOUS; SUBCUTANEOUS at 08:47

## 2024-07-31 RX ADMIN — HYDROMORPHONE HYDROCHLORIDE 0.5 MG: 2 INJECTION, SOLUTION INTRAMUSCULAR; INTRAVENOUS; SUBCUTANEOUS at 08:27

## 2024-07-31 RX ADMIN — HYDROMORPHONE HYDROCHLORIDE 1 MG: 2 INJECTION, SOLUTION INTRAMUSCULAR; INTRAVENOUS; SUBCUTANEOUS at 08:16

## 2024-07-31 RX ADMIN — EPHEDRINE SULFATE 10 MG: 5 INJECTION INTRAVENOUS at 08:36

## 2024-07-31 RX ADMIN — Medication 200 MCG: at 08:37

## 2024-07-31 RX ADMIN — EPHEDRINE SULFATE 10 MG: 5 INJECTION INTRAVENOUS at 08:34

## 2024-07-31 RX ADMIN — FENTANYL CITRATE 50 MCG: 50 INJECTION, SOLUTION INTRAMUSCULAR; INTRAVENOUS at 08:02

## 2024-07-31 RX ADMIN — GLYCOPYRROLATE 0.2 MCG: 0.2 INJECTION, SOLUTION INTRAMUSCULAR; INTRAVENOUS at 08:02

## 2024-07-31 RX ADMIN — Medication 180 MG: at 08:14

## 2024-07-31 RX ADMIN — PROPOFOL 175 MG: 10 INJECTION, EMULSION INTRAVENOUS at 08:14

## 2024-07-31 RX ADMIN — KETOROLAC TROMETHAMINE 30 MG: 30 INJECTION, SOLUTION INTRAMUSCULAR at 08:09

## 2024-07-31 RX ADMIN — LIDOCAINE HYDROCHLORIDE 40 MG: 20 INJECTION, SOLUTION INTRAVENOUS at 08:13

## 2024-07-31 RX ADMIN — Medication 200 MCG: at 09:17

## 2024-07-31 RX ADMIN — MIDAZOLAM HYDROCHLORIDE 2 MG: 1 INJECTION, SOLUTION INTRAMUSCULAR; INTRAVENOUS at 08:01

## 2024-07-31 RX ADMIN — SODIUM CHLORIDE: 9 INJECTION, SOLUTION INTRAVENOUS at 07:24

## 2024-07-31 RX ADMIN — SUGAMMADEX 500 MG: 100 INJECTION, SOLUTION INTRAVENOUS at 09:21

## 2024-07-31 NOTE — PROGRESS NOTES
Patient: Elie Elmore    YOB: 1975    Date: 06/27/2024    Primary Care Provider: Collins Fields FNP    No chief complaint on file.      SUBJECTIVE:    History of present illness:  The patient is in the office today for evaluation and treatment of a umbilical hernia.  He has had this for several years but appears to be getting bigger.      Apparently has had this for several years, and has gradually grown in size, it causes him some aspects of discomfort especially with prolonged standing or heavy lifting.    The following portions of the patient's history were reviewed and updated as appropriate: allergies, current medications, past family history, past medical history, past social history, past surgical history and problem list.      Review of Systems   Constitutional:  Negative for chills, fever and unexpected weight change.   HENT:  Negative for trouble swallowing and voice change.    Eyes:  Negative for visual disturbance.   Respiratory:  Negative for apnea, cough, chest tightness, shortness of breath and wheezing.    Cardiovascular:  Negative for chest pain, palpitations and leg swelling.   Gastrointestinal:  Negative for abdominal distention, abdominal pain, anal bleeding, blood in stool, constipation, diarrhea, nausea, rectal pain and vomiting.   Endocrine: Negative for cold intolerance and heat intolerance.   Genitourinary:  Negative for difficulty urinating, dysuria, flank pain, scrotal swelling and testicular pain.   Musculoskeletal:  Negative for back pain, gait problem and joint swelling.   Skin:  Negative for color change, rash and wound.   Neurological:  Negative for dizziness, syncope, speech difficulty, weakness, numbness and headaches.   Hematological:  Negative for adenopathy. Does not bruise/bleed easily.   Psychiatric/Behavioral:  Negative for confusion. The patient is not nervous/anxious.          Allergies:  No Known Allergies    Medications:    Current Facility-Administered  Medications:     piperacillin-tazobactam (ZOSYN) IVPB 4.5 g IVPB in 100 mL NS (VTB), 4.5 g, Intravenous, Once, Kobe Akers MD    Facility-Administered Medications Ordered in Other Encounters:     lactated ringers infusion, , Intravenous, Continuous PRN, Jason Bañuelos CRNA, New Bag at 07/31/24 0715    History:  Past Medical History:   Diagnosis Date    Diabetes mellitus     Full dentures     full upper    Hypertension     Sleep apnea     does not have a cpap       Past Surgical History:   Procedure Laterality Date    MOUTH SURGERY      SHOULDER ROTATOR CUFF REPAIR Right     psb screws implanted       Family History   Problem Relation Age of Onset    Heart disease Father     Hypertension Father     No Known Problems Mother        Social History     Tobacco Use    Smoking status: Never    Smokeless tobacco: Never   Vaping Use    Vaping status: Never Used   Substance Use Topics    Alcohol use: Not Currently    Drug use: Never        OBJECTIVE:    Vital Signs:   Vitals:    07/31/24 0657   BP: (!) 147/107   BP Location: Right arm   Patient Position: Sitting   Pulse: 71   Resp: 17   Temp: 97.5 °F (36.4 °C)   TempSrc: Temporal   SpO2: 96%       Physical Exam:       Physical Exam:     General Appearance:    Alert, cooperative, in no acute distress   Head:    Normocephalic, without obvious abnormality, atraumatic   Eyes:            Lids and lashes normal, conjunctivae and sclerae normal, no   icterus, no pallor, corneas clear, PERRLA   Ears:    Ears appear intact with no abnormalities noted   Throat:   No oral lesions, no thrush, oral mucosa moist   Neck:   No adenopathy, supple, trachea midline, no thyromegaly, no   carotid bruit, no JVD   Back:     No kyphosis present, no scoliosis present, no skin lesions,      erythema or scars, no tenderness to percussion or                   palpation,   range of motion normal   Lungs:     Clear to auscultation,respirations regular, even and                  unlabored     Heart:    Regular rhythm and normal rate, normal S1 and S2, no            murmur, no gallop, no rub, no click   Chest Wall:    No abnormalities observed   Abdomen:     Normal bowel sounds, no masses, no organomegaly, soft        non-tender, non-distended, no guarding, there is evidence of a incarcerated umbilical hernia   Extremities:   Moves all extremities well, no edema, no cyanosis, no             redness   Pulses:   Pulses palpable and equal bilaterally   Skin:   No bleeding, bruising or rash   Lymph nodes:   No palpable adenopathy   Neurologic:   Cranial nerves 2 - 12 grossly intact, sensation intact, DTR       present and equal bilaterally       Results Review:   I reviewed the patient's new clinical results.  I reviewed the patient's new imaging results and agree with the interpretation.  I reviewed the patient's other test results and agree with the interpretation    Review of Systems was reviewed and confirmed as accurate as documented by the MA.    ASSESSMENT/PLAN:    1. Umbilical hernia without obstruction and without gangrene        I had a detailed and extensive discussion with the patient in the office and they understand that they need to undergo robotic laparoscopic assisted umbilical hernia repair with mesh.  Full risks and benefits of operative versus nonoperative intervention were discussed with the patient and these included things such as nonresolution of symptoms and possible worsening of symptoms without surgical intervention versus infection, bleeding, possible recurrent hernia, possible postoperative neuralgia from nerve damage or involvement with scar tissue, etc.  The patient understands, agrees, and had no questions for me at the end of the office visit.     I discussed the patients findings and my recommendations with patient.          Electronically signed by Kobe Akers MD  07/31/24

## 2024-07-31 NOTE — ANESTHESIA PROCEDURE NOTES
Airway  Urgency: elective    Date/Time: 7/31/2024 8:15 AM  Airway not difficult    General Information and Staff    Patient location during procedure: OR  CRNA/CAA: Jason Bañuelos CRNA    Indications and Patient Condition  Indications for airway management: airway protection    Preoxygenated: yes  Mask difficulty assessment: 1 - vent by mask    Final Airway Details  Final airway type: endotracheal airway      Successful airway: ETT  Cuffed: yes   Successful intubation technique: direct laryngoscopy  Facilitating devices/methods: intubating stylet and anterior pressure/BURP  Endotracheal tube insertion site: oral  Blade: Clara  Blade size: 3  ETT size (mm): 7.5  Cormack-Lehane Classification: grade IIb - view of arytenoids or posterior of glottis only  Placement verified by: chest auscultation, capnometry and palpation of cuff   Inital cuff pressure (cm H2O): 0  Cuff volume (mL): 5  Measured from: lips  ETT/EBT  to lips (cm): 21  Number of attempts at approach: 1  Assessment: lips, teeth, and gum same as pre-op and atraumatic intubation    Additional Comments  Intubated by dvnt, cuff up with mov, bbs and expansion =, + etco, taped at lips, tolerated induction and intubation without adverse rx.

## 2024-07-31 NOTE — ANESTHESIA POSTPROCEDURE EVALUATION
Patient: Elie Elmore    Procedure Summary       Date: 07/31/24 Room / Location: [facility] OR 2 /  [facility] OR    Anesthesia Start: 0801 Anesthesia Stop:     Procedure: UMBILICAL HERNIA REPAIR LAPAROSCOPIC WITH DAVINCI ROBOT AND INCISION AND DRAINAGE OF LEFT HAND FIFTH DIGIT (Abdomen) Diagnosis:       Umbilical hernia without obstruction and without gangrene      (Umbilical hernia without obstruction and without gangrene [K42.9])    Surgeons: Kobe Akers MD Provider: Jason Bañuelos CRNA    Anesthesia Type: general, general with block ASA Status: 3            Anesthesia Type: general, general with block    Vitals  No vitals data found for the desired time range.          Post Anesthesia Care and Evaluation    Patient location during evaluation: PACU  Patient participation: complete - patient participated  Level of consciousness: awake  Pain score: 0  Pain management: adequate    Airway patency: patent  Anesthetic complications: No anesthetic complications  PONV Status: none  Cardiovascular status: acceptable  Respiratory status: acceptable, face mask, oral airway and spontaneous ventilation  Hydration status: acceptable    Comments: See R.N. note for postop vital signs.vsss resp spont, reflexes intact, responsive, report given to pacu nurse

## 2024-07-31 NOTE — OP NOTE
PATIENT:    Elie Elmore    DATE OF SURGERY:   7/31/2024    PHYSICIAN:    Kobe Akers MD    REFERRING PHYSICIAN:  Collins Fields FNP    YOB: 1975    PREOPERATIVE DIAGNOSIS:      1) Incarcerated umbilical hernia  2) Inflammation medial aspect left 5th fingernail    POSTOPERATIVE DIAGNOSIS:      1) Incarcerated umbilical hernia  2) Inflammation medial aspect left 5th fingernail    PROCEDURE:      1) Robotic assisted laparoscopic umbilical herniorraphy with reduction of incarceration  2) Placement of 11 cm Phasix ST   3) Size of defect : 4 cm  4) Incision and drainage left 5th fingernail paronychia    EBL:  Less than 50 cc    COMPLICATIONS:  None    HISTORY:  The patient presents to me for evaluation and treatment of a history significant for a history significant for incisional hernia.  They are here now today for elective repair.    ANESTHESIA:  General endotracheal.    OPERATIVE PROCEDURE:  The patient was taken to the operating room, placed in the supine position, and given general endotracheal anesthesia per the Anesthesia service.  The patient was prepped and draped in the normal sterile fashion and the patient was given preoperative IV antibiotics.  An appropriate timeout per the nursing staff was performed prior to the incision.  I did meet with the patient preoperatively and marked them accordingly.    Prior to the procedure and prepping the patient as above I did prep the left 5th finger and an #11 blade was used to incise the medial aspect of the finger at the nail bed, this was opened and spread the tissue with hemostat and then cleansed with peroxide, no josselin purulence was noted.  PSO was placed with bandage.    A left upper quadrant small incision was made to insert a Veress needle to insufflate the abdomen and CO2.  Eight 5 mm Optiview was inserted under direct vision to obtain entry to the abdominal cavity.  We then placed a 8 mm robotic trocar in the left lower quadrant,  another 8 mm robotic trocar laterally on the left side, and then a 8 mm left upper quadrant trocar laterally.  These were done under direct vision.     We then placed the robotic camera in place and docked the other ports, instruments were inserted under direct vision and then I proceeded to the robotic console.    Using the robot I performed dissection by reducing incarcerated fat from the umbilical hernia itself.  I then scored fat from the anterior abdominal wall with the Metzenbaum scissors robotically and brought this flap both superior and inferior.  A #1 Stratafix was then used under direct vision to close the hernia defect after I have the sac reduced.  I was able to tack the overlying dermis to tack the skin down to the fascia itself.    The mesh and suture had previously been placed in the abdominal cavity prior to the robot being docked.  We then used a 2-0 Stratafix was then used to tack the mesh to the peritoneum and fascia with a running suture.    I felt I had excellent hernia repair and the defect was clearly covered.  All needle counts were correct at this time, trocars were injected with local Marcaine under direct vision.  Trocars were removed after the robot was undocked, CO2 was removed from the intra-abdominal cavity.    Closure was then attended to with a 3-0 Vicryl suture on the wound and then the skin was closed with 4-0 Vicryl subcuticular stitch.  Pressure type dressing was applied. Hemostasis was previously intact.    The patient was stable at this point in time and subsequently transferred back to the recovery room in stable condition.      PLAN:  I did speak with the patient's family member Shanita at [phone] and explained the findings at the time of operative intervention, the treatment plan outlined above, and the planned postoperative treatment course.    Kobe Akers MD

## 2024-07-31 NOTE — ANESTHESIA PROCEDURE NOTES
Peripheral Block      Patient reassessed immediately prior to procedure    Start time: 7/31/2024 8:12 AM  Stop time: 7/31/2024 8:15 AM  Reason for block: at surgeon's request and post-op pain management  Performed by  CRNA/CAA: Ryan Richardson CRNA  Preanesthetic Checklist  Completed: patient identified, IV checked, site marked, risks and benefits discussed, surgical consent, monitors and equipment checked, pre-op evaluation and timeout performed  Prep:  Pt Position: supine  Sterile barriers:gloves, cap, sterile barriers and mask  Prep: ChloraPrep  Patient monitoring: blood pressure monitoring, continuous pulse oximetry and EKG  Procedure    Sedation: yes  Performed under: general  Guidance:ultrasound guided    ULTRASOUND INTERPRETATION.  Using ultrasound guidance a 21 G gauge needle was placed in close proximity to the nerve, at which point, under ultrasound guidance anesthetic was injected in the area of the nerve and spread of the anesthesia was seen on ultrasound in close proximity thereto.  There were no abnormalities seen on ultrasound; a digital image was taken; and the patient tolerated the procedure with no complications. Images:still images obtained    Laterality:Bilateral  Block Type:TAP  Injection Technique:single-shot  Needle Type:echogenic  Resistance on Injection: none    Medications Used: bupivacaine PF (MARCAINE) injection 0.5% - Injection   30 mL - 7/31/2024 8:15:00 AM      Medications  Preservative Free Saline:30ml  Comment:Block Injection: LA dose divided between Right and Left Block  Adjuncts per total volume of LA:    Decadron 10 mg PSF      If required, intravenous sedation was given -- see meds on anesthesia record.    Post Assessment  Injection Assessment: negative aspiration for heme, no paresthesia on injection and incremental injection  Patient Tolerance:comfortable throughout block  Complications:no  Additional Notes  Procedure:      BILATERAL TAP BLOCKS                              Patient analgesia was achieved with General Anesthesia    The pt was placed in the Supine Position and under Ultrasound guidance, an echogenic or touhy needle was advanced with Normal Saline hydro dissection of tissue.  The Internal Oblique and Transversus Abdominus muscles were visualized.  At or before the aponeurosis of Internal Oblique, the local anesthetic spread was visualized in the Transversus Abdominus Plane. Injection was made incrementally with aspiration every 5 mls.  There was no intravascular injection;  injection pressure was normal; there was no neural injection; and the procedure was completed without difficulty.    Performed by: Jason Bañuelos, CRNA

## 2024-07-31 NOTE — INTERVAL H&P NOTE
H&P reviewed. The patient was examined and there are no changes to the H&P.        He does have a small area of erythema on the left 5th finger on the lateral aspect at the nail.  I did discuss the situation with the patient and the wife in the preop area today, I want to prep this area after induction and then open this in order to avoid any future infection, risks and benefits of operative versus nonoperative intervention has been discussed with the patient and the wife, they understand and agree, and I have discussed this with the nursing staff in the OR.

## 2024-07-31 NOTE — DISCHARGE INSTRUCTIONS
No pushing, pulling, tugging,  heavy lifting, or strenuous activity.  No major decision making, driving, or drinking alcoholic beverages for 24 hours. ( due to the medications you have  received)  Always use good hand hygiene/washing techniques.  NO driving while taking pain medications.    * if you have an incision:  Check your incision area every day for signs of infection.   Check for:  * more redness, swelling, or pain  *more fluid or blood  *warmth  *pus or bad smell

## 2024-08-09 NOTE — PROGRESS NOTES
"Patient: Elie Elmore    YOB: 1975    Date: 08/12/2024    Primary Care Provider: Collins Fields FNP    Chief Complaint   Patient presents with    Post-op Hernia       History of present illness:  I saw the patient in the office today as a followup from their recent robotic assisted incarcerated umbilical hernia repair and incision and drainage of left 5th finger.  They state that they have done well and are having no complaints.      Vital Signs:   Vitals:    08/12/24 0907   BP: 144/88   Pulse: 71   Temp: 97.5 °F (36.4 °C)   SpO2: 96%   Weight: 129 kg (284 lb)   Height: 182.9 cm (72.01\")     Physical Exam:     General : no acute distress  Chest : clear bilaterally  COR : regular rate and rhythm  ABD :  soft nontender, all incisions healed nicely  Paronychia drainage of the left fifth finger is healing nicely with no signs of infection    Assessment / Plan:    1. Incisional hernia with obstruction but no gangrene        I did discuss the situation with the patient today in the office and they have done well from their recent herniorrhaphy and left 5th finer incision and drainage.  I have released the patient back to normal activity, they understand that they need to be careful about heavy lifting.  I need to see the patient back in the office only if they are having further problems, they know to call me if they are.    Electronically signed by Kobe Akers MD  08/12/24              "

## 2024-08-12 ENCOUNTER — OFFICE VISIT (OUTPATIENT)
Dept: SURGERY | Facility: CLINIC | Age: 49
End: 2024-08-12
Payer: COMMERCIAL

## 2024-08-12 VITALS
HEART RATE: 71 BPM | WEIGHT: 284 LBS | HEIGHT: 72 IN | SYSTOLIC BLOOD PRESSURE: 144 MMHG | BODY MASS INDEX: 38.47 KG/M2 | TEMPERATURE: 97.5 F | OXYGEN SATURATION: 96 % | DIASTOLIC BLOOD PRESSURE: 88 MMHG

## 2024-08-12 DIAGNOSIS — K43.0 INCISIONAL HERNIA WITH OBSTRUCTION BUT NO GANGRENE: Primary | ICD-10-CM

## 2024-08-12 PROCEDURE — 99213 OFFICE O/P EST LOW 20 MIN: CPT | Performed by: SURGERY

## 2024-09-17 ENCOUNTER — OFFICE VISIT (OUTPATIENT)
Dept: UROLOGY | Facility: CLINIC | Age: 49
End: 2024-09-17
Payer: COMMERCIAL

## 2024-09-17 VITALS
WEIGHT: 286 LBS | HEART RATE: 67 BPM | DIASTOLIC BLOOD PRESSURE: 94 MMHG | SYSTOLIC BLOOD PRESSURE: 155 MMHG | BODY MASS INDEX: 38.74 KG/M2 | HEIGHT: 72 IN

## 2024-09-17 DIAGNOSIS — R35.0 FREQUENCY OF URINATION: ICD-10-CM

## 2024-09-17 DIAGNOSIS — N42.9 DISORDER OF PROSTATE: ICD-10-CM

## 2024-09-17 DIAGNOSIS — R79.89 LOW TESTOSTERONE: Primary | ICD-10-CM

## 2024-09-17 LAB
DEPRECATED RDW RBC AUTO: 38.9 FL (ref 37–54)
ERYTHROCYTE [DISTWIDTH] IN BLOOD BY AUTOMATED COUNT: 13 % (ref 12.3–15.4)
ESTRADIOL SERPL HS-MCNC: 107 PG/ML
HCT VFR BLD AUTO: 54.1 % (ref 37.5–51)
HGB BLD-MCNC: 18.3 G/DL (ref 13–17.7)
MCH RBC QN AUTO: 28.3 PG (ref 26.6–33)
MCHC RBC AUTO-ENTMCNC: 33.8 G/DL (ref 31.5–35.7)
MCV RBC AUTO: 83.7 FL (ref 79–97)
PLATELET # BLD AUTO: 224 10*3/MM3 (ref 140–450)
PMV BLD AUTO: 11.5 FL (ref 6–12)
PSA SERPL-MCNC: 0.59 NG/ML (ref 0–4)
RBC # BLD AUTO: 6.46 10*6/MM3 (ref 4.14–5.8)
TESTOST SERPL-MCNC: 1136 NG/DL (ref 193–740)
WBC NRBC COR # BLD AUTO: 10.56 10*3/MM3 (ref 3.4–10.8)

## 2024-09-17 PROCEDURE — 99214 OFFICE O/P EST MOD 30 MIN: CPT | Performed by: UROLOGY

## 2024-09-17 PROCEDURE — 85027 COMPLETE CBC AUTOMATED: CPT | Performed by: UROLOGY

## 2024-09-17 PROCEDURE — 82670 ASSAY OF TOTAL ESTRADIOL: CPT | Performed by: UROLOGY

## 2024-09-17 PROCEDURE — 84153 ASSAY OF PSA TOTAL: CPT | Performed by: UROLOGY

## 2024-09-17 PROCEDURE — 84403 ASSAY OF TOTAL TESTOSTERONE: CPT | Performed by: UROLOGY

## 2024-09-17 RX ORDER — TESTOSTERONE CYPIONATE 200 MG/ML
INJECTION, SOLUTION INTRAMUSCULAR
Qty: 10 ML | Refills: 2 | Status: SHIPPED | OUTPATIENT
Start: 2024-09-17

## 2024-09-17 RX ORDER — TADALAFIL 20 MG/1
20 TABLET ORAL AS NEEDED
Qty: 20 TABLET | Refills: 1 | Status: SHIPPED | OUTPATIENT
Start: 2024-09-17

## 2024-09-17 RX ORDER — TADALAFIL 5 MG/1
5 TABLET ORAL DAILY PRN
Qty: 90 TABLET | Refills: 3 | Status: SHIPPED | OUTPATIENT
Start: 2024-09-17

## 2024-11-07 DIAGNOSIS — R79.89 LOW TESTOSTERONE: Primary | ICD-10-CM

## 2024-11-07 RX ORDER — ANASTROZOLE 1 MG/1
1 TABLET ORAL WEEKLY
Qty: 12 TABLET | Refills: 3 | Status: SHIPPED | OUTPATIENT
Start: 2024-11-07

## (undated) DEVICE — ADHS LIQ MASTISOL 2/3ML

## (undated) DEVICE — CANNULA SEAL

## (undated) DEVICE — ST TBG PNEUMOCLEAR EVAC SMOKE HIFLO

## (undated) DEVICE — Device

## (undated) DEVICE — SLV SCD CALF HEMOFORCE DVT THERP REPROC MD

## (undated) DEVICE — PAD TRENDELENBURG W/RAIL STRAP 44X23X1IN

## (undated) DEVICE — ANTIBACTERIAL UNDYED BRAIDED (POLYGLACTIN 910), SYNTHETIC ABSORBABLE SUTURE: Brand: COATED VICRYL

## (undated) DEVICE — SOL IRR NACL 0.9PCT 1000ML

## (undated) DEVICE — SYR SLPTP 30CC

## (undated) DEVICE — COLUMN DRAPE

## (undated) DEVICE — BLADELESS OBTURATOR: Brand: WECK VISTA

## (undated) DEVICE — RICH GENERAL LAPAROSCOPY: Brand: MEDLINE INDUSTRIES, INC.

## (undated) DEVICE — TIP COVER ACCESSORY

## (undated) DEVICE — ENDOPATH XCEL BLADELESS TROCARS WITH STABILITY SLEEVES: Brand: ENDOPATH XCEL

## (undated) DEVICE — HDRST POSTN SLOTTED A/

## (undated) DEVICE — MEGA SUTURECUT ND: Brand: ENDOWRIST

## (undated) DEVICE — SUT VIC 0 UR6 27IN VCP603H

## (undated) DEVICE — MONOPOLAR CURVED SCISSORS: Brand: ENDOWRIST

## (undated) DEVICE — BAND BAG 36" X 36": Brand: STERIMED

## (undated) DEVICE — FORCE BIPOLAR: Brand: ENDOWRIST

## (undated) DEVICE — MARKER,SKIN,WI/RULER AND LABELS: Brand: MEDLINE

## (undated) DEVICE — PATIENT RETURN ELECTRODE, SINGLE-USE, CONTACT QUALITY MONITORING, ADULT, WITH 9FT CORD, FOR PATIENTS WEIGING OVER 33LBS. (15KG): Brand: MEGADYNE

## (undated) DEVICE — ARM DRAPE

## (undated) DEVICE — GOWN,PREVENTION PLUS,XLARGE,STERILE: Brand: MEDLINE

## (undated) DEVICE — CLNSR INST PREKLENZ SOAK/SHLD 6ML MD

## (undated) DEVICE — GLV SURG SENSICARE W/ALOE PF LF 8.5 STRL

## (undated) DEVICE — INSUFFLATION NEEDLE TO ESTABLISH PNEUMOPERITONEUM.: Brand: INSUFFLATION NEEDLE